# Patient Record
Sex: MALE | Race: WHITE | Employment: FULL TIME | URBAN - METROPOLITAN AREA
[De-identification: names, ages, dates, MRNs, and addresses within clinical notes are randomized per-mention and may not be internally consistent; named-entity substitution may affect disease eponyms.]

---

## 2017-01-24 RX ORDER — LISINOPRIL 20 MG/1
TABLET ORAL
Qty: 90 TABLET | Refills: 0 | Status: SHIPPED | OUTPATIENT
Start: 2017-01-24 | End: 2017-04-29

## 2017-04-03 RX ORDER — ATORVASTATIN CALCIUM 40 MG/1
TABLET, FILM COATED ORAL
Qty: 90 TABLET | Refills: 0 | Status: SHIPPED | OUTPATIENT
Start: 2017-04-03 | End: 2017-07-18

## 2017-05-02 RX ORDER — LISINOPRIL 20 MG/1
TABLET ORAL
Qty: 90 TABLET | Refills: 0 | Status: SHIPPED | OUTPATIENT
Start: 2017-05-02 | End: 2017-08-04

## 2017-06-14 ENCOUNTER — TELEPHONE (OUTPATIENT)
Dept: FAMILY MEDICINE CLINIC | Facility: CLINIC | Age: 52
End: 2017-06-14

## 2017-06-14 NOTE — TELEPHONE ENCOUNTER
Pt is calling requesting  Refill on medication ATORVASTATIN CALCIUM 40 MG Oral Tab  Pt state that he is out of town and need prescription to go to [address] [phone]  Pt state that this is a one time pharm also state that he is complete

## 2017-06-15 NOTE — TELEPHONE ENCOUNTER
Pt is calling for status of his medication refill request. Per pt he is out of medication and can be reached at [phone]. Please, send the script to the [facility], [place] listed below.

## 2017-06-16 NOTE — TELEPHONE ENCOUNTER
Patient called and stated waiting for refill request for medication  Requesting a call back from nurse-

## 2017-06-19 RX ORDER — ATORVASTATIN CALCIUM 40 MG/1
TABLET, FILM COATED ORAL
Qty: 90 TABLET | Refills: 0 | Status: CANCELLED | OUTPATIENT
Start: 2017-06-19

## 2017-06-19 NOTE — TELEPHONE ENCOUNTER
Cholesterol Medications  Protocol Criteria:  · Appointment scheduled in the past 12 months or in the next 3 months  · ALT & LDL on file in the past 12 months  · ALT result < 80  · LDL result <130         Lab Results  Component Value Date   * 07/11/2

## 2017-07-17 ENCOUNTER — TELEPHONE (OUTPATIENT)
Dept: INTERNAL MEDICINE CLINIC | Facility: CLINIC | Age: 52
End: 2017-07-17

## 2017-07-17 DIAGNOSIS — Z00.00 ROUTINE PHYSICAL EXAMINATION: Primary | ICD-10-CM

## 2017-07-17 NOTE — TELEPHONE ENCOUNTER
Pt called in refill Rx atorvastatin, please advise     Current Outpatient Prescriptions:     •  ATORVASTATIN CALCIUM 40 MG Oral Tab, TAKE 1 TABLET(40 MG) BY MOUTH EVERY DAY, Disp: 90 tablet, Rfl: 0

## 2017-07-18 RX ORDER — ATORVASTATIN CALCIUM 40 MG/1
TABLET, FILM COATED ORAL
Qty: 90 TABLET | Refills: 1 | Status: SHIPPED | OUTPATIENT
Start: 2017-07-18 | End: 2017-08-29

## 2017-07-20 NOTE — TELEPHONE ENCOUNTER
Lmtcb.  When pt calls back pls inform him that labs have been generated, to be fasting for 12 hrs prior to lab draw, and stay hydrated with water

## 2017-07-28 ENCOUNTER — LAB ENCOUNTER (OUTPATIENT)
Dept: LAB | Facility: HOSPITAL | Age: 52
End: 2017-07-28
Attending: INTERNAL MEDICINE
Payer: COMMERCIAL

## 2017-07-28 DIAGNOSIS — Z00.00 ROUTINE PHYSICAL EXAMINATION: ICD-10-CM

## 2017-07-28 LAB
ALBUMIN SERPL BCP-MCNC: 4.3 G/DL (ref 3.5–4.8)
ALBUMIN/GLOB SERPL: 1.4 {RATIO} (ref 1–2)
ALP SERPL-CCNC: 66 U/L (ref 32–100)
ALT SERPL-CCNC: 24 U/L (ref 17–63)
ANION GAP SERPL CALC-SCNC: 10 MMOL/L (ref 0–18)
AST SERPL-CCNC: 25 U/L (ref 15–41)
BASOPHILS # BLD: 0 K/UL (ref 0–0.2)
BASOPHILS NFR BLD: 0 %
BILIRUB SERPL-MCNC: 0.8 MG/DL (ref 0.3–1.2)
BUN SERPL-MCNC: 11 MG/DL (ref 8–20)
BUN/CREAT SERPL: 11.7 (ref 10–20)
CALCIUM SERPL-MCNC: 9.4 MG/DL (ref 8.5–10.5)
CHLORIDE SERPL-SCNC: 101 MMOL/L (ref 95–110)
CHOLEST SERPL-MCNC: 177 MG/DL (ref 110–200)
CO2 SERPL-SCNC: 24 MMOL/L (ref 22–32)
CREAT SERPL-MCNC: 0.94 MG/DL (ref 0.5–1.5)
EOSINOPHIL # BLD: 0.1 K/UL (ref 0–0.7)
EOSINOPHIL NFR BLD: 1 %
ERYTHROCYTE [DISTWIDTH] IN BLOOD BY AUTOMATED COUNT: 13.5 % (ref 11–15)
GLOBULIN PLAS-MCNC: 3 G/DL (ref 2.5–3.7)
GLUCOSE SERPL-MCNC: 94 MG/DL (ref 70–99)
HCT VFR BLD AUTO: 43.1 % (ref 41–52)
HDLC SERPL-MCNC: 37 MG/DL
HGB BLD-MCNC: 14.9 G/DL (ref 13.5–17.5)
LDLC SERPL CALC-MCNC: 111 MG/DL (ref 0–99)
LYMPHOCYTES # BLD: 2.4 K/UL (ref 1–4)
LYMPHOCYTES NFR BLD: 37 %
MCH RBC QN AUTO: 31.6 PG (ref 27–32)
MCHC RBC AUTO-ENTMCNC: 34.6 G/DL (ref 32–37)
MCV RBC AUTO: 91.4 FL (ref 80–100)
MONOCYTES # BLD: 0.6 K/UL (ref 0–1)
MONOCYTES NFR BLD: 8 %
NEUTROPHILS # BLD AUTO: 3.6 K/UL (ref 1.8–7.7)
NEUTROPHILS NFR BLD: 53 %
NONHDLC SERPL-MCNC: 140 MG/DL
OSMOLALITY UR CALC.SUM OF ELEC: 279 MOSM/KG (ref 275–295)
PLATELET # BLD AUTO: 256 K/UL (ref 140–400)
PMV BLD AUTO: 8.3 FL (ref 7.4–10.3)
POTASSIUM SERPL-SCNC: 4.1 MMOL/L (ref 3.3–5.1)
PROT SERPL-MCNC: 7.3 G/DL (ref 5.9–8.4)
PSA SERPL-MCNC: 1.7 NG/ML (ref 0–4)
RBC # BLD AUTO: 4.71 M/UL (ref 4.5–5.9)
SODIUM SERPL-SCNC: 135 MMOL/L (ref 136–144)
TRIGL SERPL-MCNC: 143 MG/DL (ref 1–149)
TSH SERPL-ACNC: 1.91 UIU/ML (ref 0.45–5.33)
URATE SERPL-MCNC: 7 MG/DL (ref 3.3–8.7)
WBC # BLD AUTO: 6.7 K/UL (ref 4–11)

## 2017-07-28 PROCEDURE — 80053 COMPREHEN METABOLIC PANEL: CPT

## 2017-07-28 PROCEDURE — 84550 ASSAY OF BLOOD/URIC ACID: CPT

## 2017-07-28 PROCEDURE — 36415 COLL VENOUS BLD VENIPUNCTURE: CPT

## 2017-07-28 PROCEDURE — 80061 LIPID PANEL: CPT

## 2017-07-28 PROCEDURE — 84443 ASSAY THYROID STIM HORMONE: CPT

## 2017-07-28 PROCEDURE — 85025 COMPLETE CBC W/AUTO DIFF WBC: CPT

## 2017-08-08 RX ORDER — LISINOPRIL 20 MG/1
TABLET ORAL
Qty: 90 TABLET | Refills: 0 | Status: SHIPPED | OUTPATIENT
Start: 2017-08-08 | End: 2017-08-29

## 2017-08-08 NOTE — TELEPHONE ENCOUNTER
Hypertensive Medications: Refilled per protocol    Protocol Criteria:  · Appointment scheduled in the past 6 months or in the next 3 months  · BMP or CMP in the past 12 months  · Creatinine result < 2  Recent Outpatient Visits            1 year ago Routine

## 2017-08-29 ENCOUNTER — OFFICE VISIT (OUTPATIENT)
Dept: INTERNAL MEDICINE CLINIC | Facility: CLINIC | Age: 52
End: 2017-08-29

## 2017-08-29 VITALS
HEART RATE: 72 BPM | WEIGHT: 271.13 LBS | SYSTOLIC BLOOD PRESSURE: 118 MMHG | DIASTOLIC BLOOD PRESSURE: 78 MMHG | RESPIRATION RATE: 20 BRPM | TEMPERATURE: 97 F | BODY MASS INDEX: 38.38 KG/M2 | HEIGHT: 70.5 IN

## 2017-08-29 DIAGNOSIS — Z12.11 COLON CANCER SCREENING: ICD-10-CM

## 2017-08-29 DIAGNOSIS — M10.9 GOUT, UNSPECIFIED CAUSE, UNSPECIFIED CHRONICITY, UNSPECIFIED SITE: ICD-10-CM

## 2017-08-29 DIAGNOSIS — I10 ESSENTIAL HYPERTENSION: ICD-10-CM

## 2017-08-29 DIAGNOSIS — Z00.00 ROUTINE PHYSICAL EXAMINATION: Primary | ICD-10-CM

## 2017-08-29 DIAGNOSIS — E78.5 HYPERLIPIDEMIA, UNSPECIFIED HYPERLIPIDEMIA TYPE: ICD-10-CM

## 2017-08-29 PROCEDURE — 99396 PREV VISIT EST AGE 40-64: CPT | Performed by: INTERNAL MEDICINE

## 2017-08-29 RX ORDER — LISINOPRIL 20 MG/1
20 TABLET ORAL DAILY
Qty: 90 TABLET | Refills: 3 | Status: SHIPPED | OUTPATIENT
Start: 2017-08-29 | End: 2018-11-16

## 2017-08-29 RX ORDER — ATORVASTATIN CALCIUM 40 MG/1
TABLET, FILM COATED ORAL
Qty: 90 TABLET | Refills: 3 | Status: SHIPPED | OUTPATIENT
Start: 2017-08-29 | End: 2018-02-07

## 2017-08-29 NOTE — PROGRESS NOTES
HPI:    Patient ID: Aziza Murillo is a 45year old male. HPI  Patient here for physical exam and follow-up on chronic medical issues as listed below. Blood work done about a month ago. Everything looked good. No major complaints.   No gout flares for normal.   Mouth/Throat: Oropharynx is clear and moist.   Eyes: Conjunctivae and EOM are normal. Pupils are equal, round, and reactive to light. Neck: No thyromegaly present.    Cardiovascular: Normal rate, regular rhythm, normal heart sounds and intact di prescriptions requested or ordered in this encounter    Imaging & Referrals:  None         ID#[id number]

## 2018-02-07 RX ORDER — ATORVASTATIN CALCIUM 40 MG/1
TABLET, FILM COATED ORAL
Qty: 90 TABLET | Refills: 0 | Status: SHIPPED | OUTPATIENT
Start: 2018-02-07 | End: 2020-01-16

## 2018-09-01 NOTE — TELEPHONE ENCOUNTER
CSS, please contact patient and assist in scheduling overdue f/u or Px (LOV= 8/29/17. Once scheduled please route back for refill review.     Cholesterol Medications  Protocol Criteria:  · Appointment scheduled in the past 12 months or in the next 3 months

## 2018-09-01 NOTE — TELEPHONE ENCOUNTER
CSS, pt was seem 8/29/17 not 8/29/18; please contact as per instruction below. Copied from routing comment:  Eber Horton   Em Rn Triage 2 minutes ago (12:06 PM)      Patient was seen for Physical on 08/29/2018.   (Routing comment)

## 2018-09-04 RX ORDER — ATORVASTATIN CALCIUM 40 MG/1
TABLET, FILM COATED ORAL
Qty: 90 TABLET | Refills: 0 | Status: SHIPPED | OUTPATIENT
Start: 2018-09-04 | End: 2018-12-12

## 2018-09-04 NOTE — TELEPHONE ENCOUNTER
Failed per nursing protocol - please advise on refill request     Please also see message - pt requesting for blood work orders for upcoming appt     Cholesterol Medications  Protocol Criteria:  · Appointment scheduled in the past 12 months or in the next

## 2018-09-06 ENCOUNTER — TELEPHONE (OUTPATIENT)
Dept: INTERNAL MEDICINE CLINIC | Facility: CLINIC | Age: 53
End: 2018-09-06

## 2018-09-06 DIAGNOSIS — Z00.00 ROUTINE PHYSICAL EXAMINATION: Primary | ICD-10-CM

## 2018-09-07 NOTE — TELEPHONE ENCOUNTER
Left message for pt informing that Labs have been ordered  Left message stating that any questions to call back, informed that they are fasting labs

## 2018-10-09 ENCOUNTER — OFFICE VISIT (OUTPATIENT)
Dept: INTERNAL MEDICINE CLINIC | Facility: CLINIC | Age: 53
End: 2018-10-09
Payer: COMMERCIAL

## 2018-10-09 ENCOUNTER — LAB ENCOUNTER (OUTPATIENT)
Dept: LAB | Facility: HOSPITAL | Age: 53
End: 2018-10-09
Attending: INTERNAL MEDICINE
Payer: COMMERCIAL

## 2018-10-09 VITALS
WEIGHT: 278 LBS | HEART RATE: 94 BPM | BODY MASS INDEX: 39.36 KG/M2 | TEMPERATURE: 98 F | DIASTOLIC BLOOD PRESSURE: 78 MMHG | RESPIRATION RATE: 20 BRPM | SYSTOLIC BLOOD PRESSURE: 112 MMHG | HEIGHT: 70.5 IN

## 2018-10-09 DIAGNOSIS — I10 ESSENTIAL HYPERTENSION: ICD-10-CM

## 2018-10-09 DIAGNOSIS — R10.32 LEFT GROIN PAIN: ICD-10-CM

## 2018-10-09 DIAGNOSIS — Z00.00 ROUTINE PHYSICAL EXAMINATION: ICD-10-CM

## 2018-10-09 DIAGNOSIS — Z00.00 ROUTINE PHYSICAL EXAMINATION: Primary | ICD-10-CM

## 2018-10-09 DIAGNOSIS — M10.9 GOUT, UNSPECIFIED CAUSE, UNSPECIFIED CHRONICITY, UNSPECIFIED SITE: ICD-10-CM

## 2018-10-09 DIAGNOSIS — E78.5 HYPERLIPIDEMIA, UNSPECIFIED HYPERLIPIDEMIA TYPE: ICD-10-CM

## 2018-10-09 DIAGNOSIS — Z12.11 COLON CANCER SCREENING: ICD-10-CM

## 2018-10-09 DIAGNOSIS — M25.522 LEFT ELBOW PAIN: ICD-10-CM

## 2018-10-09 DIAGNOSIS — G47.00 INSOMNIA, UNSPECIFIED TYPE: ICD-10-CM

## 2018-10-09 PROCEDURE — 85025 COMPLETE CBC W/AUTO DIFF WBC: CPT

## 2018-10-09 PROCEDURE — 84443 ASSAY THYROID STIM HORMONE: CPT

## 2018-10-09 PROCEDURE — 80061 LIPID PANEL: CPT

## 2018-10-09 PROCEDURE — 84550 ASSAY OF BLOOD/URIC ACID: CPT

## 2018-10-09 PROCEDURE — 80053 COMPREHEN METABOLIC PANEL: CPT

## 2018-10-09 PROCEDURE — 99396 PREV VISIT EST AGE 40-64: CPT | Performed by: INTERNAL MEDICINE

## 2018-10-09 PROCEDURE — 36415 COLL VENOUS BLD VENIPUNCTURE: CPT

## 2018-10-09 PROCEDURE — 82607 VITAMIN B-12: CPT

## 2018-10-09 RX ORDER — LISINOPRIL 20 MG/1
TABLET ORAL
COMMUNITY
Start: 2009-02-10 | End: 2018-10-09

## 2018-10-09 RX ORDER — ATORVASTATIN CALCIUM 40 MG/1
TABLET, FILM COATED ORAL
COMMUNITY
Start: 2009-02-10 | End: 2018-10-09

## 2018-10-09 NOTE — PROGRESS NOTES
HPI:    Patient ID: Carlos Nation is a 52year old male. HPI  Patient is here requesting a physical exam and follow-up on chronic medical issues as listed below. Last seen here in August of last year. Changes made at that time.   Advised to have colon beers - weekly    Drug use: No           Review of Systems   Constitutional: Negative for chills, fatigue and fever. HENT: Negative for hearing loss. Eyes: Negative for visual disturbance. Respiratory: Negative for cough and shortness of breath. Circumcised. Musculoskeletal: He exhibits no tenderness. Left elbow– full range of motion; no tenderness; no swelling or erythema or increased warmth.   Left hip and groin–pain in the inner groin area with movement of the left leg particularly abduction work.  Discussed the importance of Her stress management and improving his work life balance in order to help his overall health. He could try melatonin at night and see if that helps. I would avoid long-term sleeping pills.     Meds This Visit:  Requeste

## 2018-11-16 RX ORDER — LISINOPRIL 20 MG/1
TABLET ORAL
Qty: 90 TABLET | Refills: 0 | Status: SHIPPED | OUTPATIENT
Start: 2018-11-16 | End: 2019-03-23

## 2018-12-12 RX ORDER — ATORVASTATIN CALCIUM 40 MG/1
TABLET, FILM COATED ORAL
Qty: 90 TABLET | Refills: 0 | Status: SHIPPED | OUTPATIENT
Start: 2018-12-12 | End: 2019-03-23

## 2019-01-29 ENCOUNTER — APPOINTMENT (OUTPATIENT)
Dept: LAB | Facility: HOSPITAL | Age: 54
End: 2019-01-29
Attending: INTERNAL MEDICINE
Payer: COMMERCIAL

## 2019-01-29 DIAGNOSIS — Z01.812 PRE-OPERATIVE LABORATORY EXAMINATION: ICD-10-CM

## 2019-01-29 DIAGNOSIS — M75.101 ROTATOR CUFF SYNDROME OF RIGHT SHOULDER: Primary | ICD-10-CM

## 2019-01-29 DIAGNOSIS — Z01.810 PRE-OPERATIVE CARDIOVASCULAR EXAMINATION: ICD-10-CM

## 2019-01-29 LAB
CHLORIDE SERPL-SCNC: 99 MMOL/L (ref 95–110)
CO2 SERPL-SCNC: 23 MMOL/L (ref 22–32)
POTASSIUM SERPL-SCNC: 4.1 MMOL/L (ref 3.3–5.1)
SODIUM SERPL-SCNC: 136 MMOL/L (ref 136–144)

## 2019-01-29 PROCEDURE — 93005 ELECTROCARDIOGRAM TRACING: CPT

## 2019-01-29 PROCEDURE — 80051 ELECTROLYTE PANEL: CPT

## 2019-01-29 PROCEDURE — 36415 COLL VENOUS BLD VENIPUNCTURE: CPT

## 2019-01-29 PROCEDURE — 93010 ELECTROCARDIOGRAM REPORT: CPT | Performed by: ANESTHESIOLOGY

## 2019-03-07 ENCOUNTER — NURSE TRIAGE (OUTPATIENT)
Dept: OTHER | Age: 54
End: 2019-03-07

## 2019-03-07 RX ORDER — INDOMETHACIN 50 MG/1
50 CAPSULE ORAL
Qty: 30 CAPSULE | Refills: 1 | Status: SHIPPED | OUTPATIENT
Start: 2019-03-07 | End: 2020-05-27 | Stop reason: ALTCHOICE

## 2019-03-07 NOTE — TELEPHONE ENCOUNTER
Patient called back. Informed him rx for indomethacin was sent to his pharmacy. Patient verbalized understanding.

## 2019-03-07 NOTE — TELEPHONE ENCOUNTER
Action Requested: Summary for Provider     []  Critical Lab, Recommendations Needed  [x] Need Additional Advice  []   FYI    []   Need Orders  [x] Need Medications Sent to Pharmacy  []  Other     SUMMARY: patient stated that is having a gout flare up since

## 2019-03-23 RX ORDER — LISINOPRIL 20 MG/1
TABLET ORAL
Qty: 90 TABLET | Refills: 0 | Status: SHIPPED | OUTPATIENT
Start: 2019-03-23 | End: 2019-06-22

## 2019-03-23 RX ORDER — ATORVASTATIN CALCIUM 40 MG/1
TABLET, FILM COATED ORAL
Qty: 90 TABLET | Refills: 0 | Status: SHIPPED | OUTPATIENT
Start: 2019-03-23 | End: 2019-06-22

## 2019-06-22 RX ORDER — ATORVASTATIN CALCIUM 40 MG/1
TABLET, FILM COATED ORAL
Qty: 90 TABLET | Refills: 1 | Status: SHIPPED | OUTPATIENT
Start: 2019-06-22 | End: 2019-12-16

## 2019-06-23 RX ORDER — LISINOPRIL 20 MG/1
TABLET ORAL
Qty: 90 TABLET | Refills: 0 | Status: SHIPPED | OUTPATIENT
Start: 2019-06-23 | End: 2019-10-01

## 2019-10-02 RX ORDER — LISINOPRIL 20 MG/1
TABLET ORAL
Qty: 30 TABLET | Refills: 0 | Status: SHIPPED | OUTPATIENT
Start: 2019-10-02 | End: 2019-11-06

## 2019-10-02 NOTE — TELEPHONE ENCOUNTER
Refill passed per [facility] protocol.   Hypertensive Medications  Protocol Criteria:  · Appointment scheduled in the past 6 months or in the next 3 months  · BMP or CMP in the past 12 months  · Creatinine result < 2  Recent Outpatient Visits

## 2019-11-07 RX ORDER — LISINOPRIL 20 MG/1
TABLET ORAL
Qty: 30 TABLET | Refills: 0 | Status: SHIPPED | OUTPATIENT
Start: 2019-11-07 | End: 2019-12-11

## 2019-11-07 NOTE — TELEPHONE ENCOUNTER
Patient is scheduled for 12/16/2019 with Dr. Rodney Bailey for physical. He is out of medication below. Needs another refill because had to reschedule from last scheduled date to provider reschedule.

## 2019-12-11 RX ORDER — LISINOPRIL 20 MG/1
TABLET ORAL
Qty: 90 TABLET | Refills: 0 | Status: SHIPPED | OUTPATIENT
Start: 2019-12-11 | End: 2020-03-16

## 2019-12-11 NOTE — TELEPHONE ENCOUNTER
Refill passed per [facility] protocol. Has appt 12/16/19.      Requested Prescriptions     Pending Prescriptions Disp Refills   • LISINOPRIL 20 MG Oral Tab [Pharmacy Med Name: LISINOPRIL 20MG TABLETS] 30 tablet 0     Sig: TAKE 1 TABLET(20 MG) BY MOUTH D

## 2019-12-16 ENCOUNTER — OFFICE VISIT (OUTPATIENT)
Dept: INTERNAL MEDICINE CLINIC | Facility: CLINIC | Age: 54
End: 2019-12-16
Payer: COMMERCIAL

## 2019-12-16 ENCOUNTER — LAB ENCOUNTER (OUTPATIENT)
Dept: LAB | Facility: HOSPITAL | Age: 54
End: 2019-12-16
Attending: INTERNAL MEDICINE
Payer: COMMERCIAL

## 2019-12-16 VITALS
TEMPERATURE: 98 F | HEIGHT: 73 IN | BODY MASS INDEX: 35.78 KG/M2 | WEIGHT: 270 LBS | SYSTOLIC BLOOD PRESSURE: 120 MMHG | RESPIRATION RATE: 20 BRPM | HEART RATE: 72 BPM | DIASTOLIC BLOOD PRESSURE: 82 MMHG

## 2019-12-16 DIAGNOSIS — I10 ESSENTIAL HYPERTENSION: ICD-10-CM

## 2019-12-16 DIAGNOSIS — D23.9 BENIGN NEOPLASM OF SKIN, UNSPECIFIED LOCATION: ICD-10-CM

## 2019-12-16 DIAGNOSIS — Z12.11 COLON CANCER SCREENING: ICD-10-CM

## 2019-12-16 DIAGNOSIS — Z00.00 ROUTINE PHYSICAL EXAMINATION: Primary | ICD-10-CM

## 2019-12-16 DIAGNOSIS — G47.00 INSOMNIA, UNSPECIFIED TYPE: ICD-10-CM

## 2019-12-16 DIAGNOSIS — R10.32 LEFT GROIN PAIN: ICD-10-CM

## 2019-12-16 DIAGNOSIS — E78.5 HYPERLIPIDEMIA, UNSPECIFIED HYPERLIPIDEMIA TYPE: ICD-10-CM

## 2019-12-16 DIAGNOSIS — M10.9 GOUT, UNSPECIFIED CAUSE, UNSPECIFIED CHRONICITY, UNSPECIFIED SITE: ICD-10-CM

## 2019-12-16 DIAGNOSIS — Z00.00 ROUTINE PHYSICAL EXAMINATION: ICD-10-CM

## 2019-12-16 PROCEDURE — 84550 ASSAY OF BLOOD/URIC ACID: CPT

## 2019-12-16 PROCEDURE — 84443 ASSAY THYROID STIM HORMONE: CPT

## 2019-12-16 PROCEDURE — 85025 COMPLETE CBC W/AUTO DIFF WBC: CPT

## 2019-12-16 PROCEDURE — 80053 COMPREHEN METABOLIC PANEL: CPT

## 2019-12-16 PROCEDURE — 99396 PREV VISIT EST AGE 40-64: CPT | Performed by: INTERNAL MEDICINE

## 2019-12-16 PROCEDURE — 80061 LIPID PANEL: CPT

## 2019-12-16 PROCEDURE — 36415 COLL VENOUS BLD VENIPUNCTURE: CPT

## 2019-12-16 NOTE — PROGRESS NOTES
HPI:    Patient ID: Mary Agudelo is a 62year old male. HPI  Patient here requesting physical exam and follow-up on chronic medical issues. Last seen here in October 2018. He never had the colonoscopy follow-up. He is never had a colonoscopy. Gilma Castro Relation Age of Onset   • Lipids Father         hyperlipidemia   • Hypertension Father    • Lipids Mother         hyperlipidemia   • Hypertension Mother    • Cataracts Sister    • Cataracts Brother       Social History    Tobacco Use      Smoking status: N membrane and ear canal normal.   Nose: Nose normal.   Mouth/Throat: Oropharynx is clear and moist.   Eyes: Pupils are equal, round, and reactive to light. Conjunctivae and EOM are normal.   Neck: No thyromegaly present.    Cardiovascular: Normal rate, regul METABOLIC PANEL (14); Future  - LIPID PANEL; Future  - TSH W REFLEX TO FREE T4; Future  - PSA SCREEN; Future    2. Essential hypertension  Well-controlled. Continue current medications.     3. Hyperlipidemia, unspecified hyperlipidemia type  Check lipid pr

## 2020-01-16 RX ORDER — ATORVASTATIN CALCIUM 40 MG/1
TABLET, FILM COATED ORAL
Qty: 90 TABLET | Refills: 1 | Status: SHIPPED | OUTPATIENT
Start: 2020-01-16 | End: 2020-07-03

## 2020-01-16 NOTE — TELEPHONE ENCOUNTER
Refill passed per [facility] protocol.     Cholesterol Medications  Protocol Criteria:  · Appointment scheduled in the past 12 months or in the next 3 months  · ALT & LDL on file in the past 12 months  · ALT result < 80  · LDL result <130   Recent Outp

## 2020-01-16 NOTE — TELEPHONE ENCOUNTER
Per pt this is the first time he will be requesting this medication for this pharmacy. Pt would like a refill on atorvastatin. Per pt he is out of medication.  Pharmacy:     Current Outpatient Medications   Medication Sig Dispense Refill   • ATORVASTATIN 40

## 2020-02-05 ENCOUNTER — NURSE ONLY (OUTPATIENT)
Dept: GASTROENTEROLOGY | Facility: CLINIC | Age: 55
End: 2020-02-05

## 2020-02-05 DIAGNOSIS — Z12.11 COLON CANCER SCREENING: Primary | ICD-10-CM

## 2020-02-05 NOTE — PROGRESS NOTES
The patient's chart has been reviewed. Okay to schedule pt for colonoscopy r/t screening for colon cancer with Dr. Roz Infante, Dr. Neeta Cole, Dr. Johanna Gil.      Advise MAC (Dr. Sarah Light) or IV Twilight or MAC (Dr. Johanna Gil) sedation with split dose Co

## 2020-02-05 NOTE — PROGRESS NOTES
Last Procedure:  Never had a colonoscopy done before.   Anticoagulants:no  Diabetic Meds: no  BP meds(Ace inhibitors/ARB's):Lisinopril  Weight loss meds (phentermine):no  Iron supplement (RX/OTC):no  Height & Weight/BMI:6'1/270/35.63  Cardiac/CVA issues/Pac

## 2020-02-12 NOTE — PROGRESS NOTES
Scheduled for:  Colonoscopy - [address]64  Provider Name:  Dr. Indu Louis  Date:  4/27/20  Location:  [facility]  Sedation:  MAC  Time:  9:45 am (pt is aware to arrive at 8:45 am)  Prep:  Colyte, Prep instructions were given to pt over the phone, pt verbalized understanding

## 2020-02-18 ENCOUNTER — OFFICE VISIT (OUTPATIENT)
Dept: DERMATOLOGY CLINIC | Facility: CLINIC | Age: 55
End: 2020-02-18
Payer: COMMERCIAL

## 2020-02-18 DIAGNOSIS — D48.5 NEOPLASM OF UNCERTAIN BEHAVIOR OF SKIN: Primary | ICD-10-CM

## 2020-02-18 PROCEDURE — 88305 TISSUE EXAM BY PATHOLOGIST: CPT | Performed by: DERMATOLOGY

## 2020-02-18 PROCEDURE — 11103 TANGNTL BX SKIN EA SEP/ADDL: CPT | Performed by: DERMATOLOGY

## 2020-02-18 PROCEDURE — 11104 PUNCH BX SKIN SINGLE LESION: CPT | Performed by: DERMATOLOGY

## 2020-02-18 PROCEDURE — 99201 OFFICE/OUTPT VISIT,NEW,LEVL I: CPT | Performed by: DERMATOLOGY

## 2020-02-18 NOTE — PROGRESS NOTES
HPI:     Chief Complaint     Lesion        HPI     Lesion      Additional comments: New pt. pt presenting today with lesion to R calf and L forearm for 2 years. Lesion on R claf have changed in size. Denies pain, c/o itching.  Denies family and personal HX taking: Reported on 2/18/2020 ) 1 Bottle 0     Allergies:   No Known Allergies    Past Medical History:   Diagnosis Date   • Biceps muscle tear 2011    torn llt.  bicep - ice hockey injury; surgical repair   • Cataract     monitor   • Lipid screening 2/20/2 Topics      Concerns:         Service: Not Asked        Blood Transfusions: Not Asked        Caffeine Concern: Yes          soda        Occupational Exposure: Not Asked        Hobby Hazards: Not Asked        Sleep Concern: Not Asked        Stress C results. All postop instructions given.   Patient will call with any interim problems or concerns    Orders Placed This Encounter      PUNCH BIOPSY SKIN SINGLE LESION      TANGENTIAL BIOPSY SKIN SINGLE LESION      Specimen to Pathology, Tissue [IHP Pt to E

## 2020-02-21 NOTE — PROGRESS NOTES
Results logged in pathology book and site A was entered in [facility]. Pt to be informed of results.

## 2020-02-24 ENCOUNTER — TELEPHONE (OUTPATIENT)
Dept: DERMATOLOGY CLINIC | Facility: CLINIC | Age: 55
End: 2020-02-24

## 2020-02-24 NOTE — PROGRESS NOTES
Spoke with pt to inform him about his appt scheduled for suture removal and to discuss biopsy results ONLY. Pt advised he will need to scheduled a separate appt for fully body. Will call pt with LSS' availability.   Pt states \"I do not want to have to wa

## 2020-02-24 NOTE — PROGRESS NOTES
Patient informed of test results and all LSS' recommendations. Voiced understanding.   Dr. Mague Quintero' info provided to pt, path routed to his office, pt has an appt to see you tomorrow at 3:45pm for \"follow up\" - he wants to know if that was for the ful

## 2020-02-24 NOTE — TELEPHONE ENCOUNTER
Pt does not want to wait until the next full body check. Asking to be seen sooner.  Please advise    LOV 2/18/2020

## 2020-02-24 NOTE — TELEPHONE ENCOUNTER
Pt offered full body check appointment on 4/28. Refused. Offered to schedule for a different day. Refused.  NIKOS

## 2020-02-24 NOTE — TELEPHONE ENCOUNTER
Please let patient know that our schedule is full for a few months, and I would recommend he make appointment for full body check for the next available opening. Let him know that we will always get our patients in for any urgent concerns.   Please tell hi

## 2020-02-25 ENCOUNTER — OFFICE VISIT (OUTPATIENT)
Dept: DERMATOLOGY CLINIC | Facility: CLINIC | Age: 55
End: 2020-02-25
Payer: COMMERCIAL

## 2020-02-25 DIAGNOSIS — L81.4 SOLAR LENTIGO: ICD-10-CM

## 2020-02-25 DIAGNOSIS — L82.1 SEBORRHEIC KERATOSES: ICD-10-CM

## 2020-02-25 DIAGNOSIS — L92.0 GRANULOMA ANNULARE: ICD-10-CM

## 2020-02-25 DIAGNOSIS — L57.8 SUN-DAMAGED SKIN: ICD-10-CM

## 2020-02-25 DIAGNOSIS — D22.9 MULTIPLE BENIGN NEVI: ICD-10-CM

## 2020-02-25 DIAGNOSIS — L71.9 ROSACEA: ICD-10-CM

## 2020-02-25 DIAGNOSIS — C44.722 SCC (SQUAMOUS CELL CARCINOMA), LEG, RIGHT: Primary | ICD-10-CM

## 2020-02-25 DIAGNOSIS — L57.0 ACTINIC KERATOSIS: ICD-10-CM

## 2020-02-25 PROCEDURE — 17000 DESTRUCT PREMALG LESION: CPT | Performed by: DERMATOLOGY

## 2020-02-25 PROCEDURE — 99213 OFFICE O/P EST LOW 20 MIN: CPT | Performed by: DERMATOLOGY

## 2020-02-25 PROCEDURE — 17003 DESTRUCT PREMALG LES 2-14: CPT | Performed by: DERMATOLOGY

## 2020-02-25 NOTE — PROGRESS NOTES
HPI:     Chief Complaint     Suture Removal; Full Skin Exam        HPI     Suture Removal      Additional comments: LOV 2/18/2020 - Pt presenting for suture removal from punch biopsy performed on left forarem. LSS to review biopsy results with pt. 40 MG Oral Tab TAKE 1 TABLET(40 MG) BY MOUTH EVERY DAY 90 tablet 1   • LISINOPRIL 20 MG Oral Tab TAKE 1 TABLET(20 MG) BY MOUTH DAILY 90 tablet 0   • indomethacin 50 MG Oral Cap Take 1 capsule (50 mg total) by mouth 3 (three) times daily with meals.  As need on file        Active member of club or organization: Not on file        Attends meetings of clubs or organizations: Not on file        Relationship status: Not on file      Intimate partner violence:        Fear of current or ex partner: Not on file erythema and keratotic scale on the frontal scalp, and there are 2 more keratotic slightly pinkish papules at posterior right shoulder  - there are some cherry red papules  - there are numerous light brown and tan stuck on keratoses.   Dalia Shaw is a erythemat

## 2020-03-02 ENCOUNTER — OFFICE VISIT (OUTPATIENT)
Dept: SURGERY | Facility: CLINIC | Age: 55
End: 2020-03-02
Payer: COMMERCIAL

## 2020-03-02 DIAGNOSIS — C44.722 SQUAMOUS CELL CARCINOMA OF SKIN OF RIGHT LOWER LIMB, INCLUDING HIP: Primary | ICD-10-CM

## 2020-03-02 PROCEDURE — 99243 OFF/OP CNSLTJ NEW/EST LOW 30: CPT | Performed by: PLASTIC SURGERY

## 2020-03-02 RX ORDER — HYDROCODONE BITARTRATE AND ACETAMINOPHEN 7.5; 325 MG/1; MG/1
1 TABLET ORAL
Qty: 10 TABLET | Refills: 0 | Status: SHIPPED | OUTPATIENT
Start: 2020-03-02 | End: 2020-05-27 | Stop reason: ALTCHOICE

## 2020-03-02 NOTE — PROGRESS NOTES
Patient request for surgery signed by patient and witnessed and signed by RN. Prescription for norco and narcotic prescription electronically sent to pharmacy per Dr. Tone hernandez and patient instructed to  prescription before surgery.    Pre-

## 2020-03-02 NOTE — H&P
Mark Dudley is a 62year old male that presents with Patient presents with:  Lesion: SCC R LEG      REFERRED BY:  Margarita Mendiola    Pacemaker: No  Latex Allergy: no  Coumadin: No  Plavix: No  Other anticoagulants: No  Cardiac stents: No    HAND DOMINANCE • atorvastatin 40 MG Oral Tab TAKE 1 TABLET(40 MG) BY MOUTH EVERY DAY 90 tablet 1   • LISINOPRIL 20 MG Oral Tab TAKE 1 TABLET(20 MG) BY MOUTH DAILY 90 tablet 0   • PEG 3350-KCl-NaBcb-NaCl-NaSulf (COLYTE WITH FLAVOR PACKS) 240 g Oral Recon Soln Take prep EXCISION: treatment requires wide excision. A scar will result, which will be permanent. The scar will be considerably longer than the size of the lesion, as we need to excise margins around the tumor.   Further excision may be necessary, if tumor is pres

## 2020-03-12 ENCOUNTER — TELEPHONE (OUTPATIENT)
Dept: SURGERY | Facility: CLINIC | Age: 55
End: 2020-03-12

## 2020-03-12 DIAGNOSIS — C44.722 SQUAMOUS CELL CARCINOMA, LEG, RIGHT: Primary | ICD-10-CM

## 2020-03-16 ENCOUNTER — TELEPHONE (OUTPATIENT)
Dept: OTHER | Age: 55
End: 2020-03-16

## 2020-03-16 RX ORDER — LISINOPRIL 20 MG/1
TABLET ORAL
Qty: 90 TABLET | Refills: 1 | Status: SHIPPED | OUTPATIENT
Start: 2020-03-16 | End: 2020-05-09

## 2020-03-16 NOTE — TELEPHONE ENCOUNTER
Pt states pharmacy sent Lisinopril refill request and is calling to follow up. No refill request noted in EMR.     Prescription refilled per [facility] refill protocol    Hypertensive Medications  Protocol Criteria:  · Appointment scheduled in the past 6 months or 12/16/2019    K 4.3 12/16/2019     12/16/2019    CO2 28.0 12/16/2019    GLOBULIN 4.0 12/16/2019    AGRATIO 1.3 07/11/2016    ANIONGAP 3 12/16/2019    OSMOCALC 284 12/16/2019

## 2020-04-24 ENCOUNTER — TELEPHONE (OUTPATIENT)
Dept: GASTROENTEROLOGY | Facility: CLINIC | Age: 55
End: 2020-04-24

## 2020-04-24 DIAGNOSIS — Z12.11 SCREEN FOR COLON CANCER: Primary | ICD-10-CM

## 2020-04-24 NOTE — TELEPHONE ENCOUNTER
Scheduled for:  Colonoscopy 159[address]  Provider Name:  Dr. Jessica Andrade  Date:  7/24/2020  Location:  [facility]  Sedation:  MAC  Time:  8:15 am, arrival 7:15 am  Prep:  Colyte  Meds/Allergies Reconciled?:  Yes  Diagnosis with codes:  Colon cancer screening Z12.11  Was nithya

## 2020-04-29 ENCOUNTER — TELEPHONE (OUTPATIENT)
Dept: SURGERY | Facility: CLINIC | Age: 55
End: 2020-04-29

## 2020-05-07 ENCOUNTER — TELEPHONE (OUTPATIENT)
Dept: SURGERY | Facility: CLINIC | Age: 55
End: 2020-05-07

## 2020-05-07 NOTE — TELEPHONE ENCOUNTER
Left message for patient to call the office to update H&P and schedule an RN appointment on 5/27/2020.

## 2020-05-09 ENCOUNTER — TELEPHONE (OUTPATIENT)
Dept: INTERNAL MEDICINE CLINIC | Facility: CLINIC | Age: 55
End: 2020-05-09

## 2020-05-09 RX ORDER — LISINOPRIL 20 MG/1
TABLET ORAL
Qty: 90 TABLET | Refills: 1 | Status: SHIPPED | OUTPATIENT
Start: 2020-05-09 | End: 2020-11-06

## 2020-05-09 NOTE — TELEPHONE ENCOUNTER
Call pt. Rx sent to pharmacy. He needs appt with me in June or July. If he does not feel safe in the office, then he can make a video visit appt. He would need to check his BP before the video visit so he can say how it is running.

## 2020-05-09 NOTE — TELEPHONE ENCOUNTER
Current Outpatient Medications:   •  lisinopril 20 MG Oral Tab, TAKE 1 TABLET(20 MG) BY MOUTH DAILY, Disp: 90 tablet, Rfl: 1  •

## 2020-05-13 ENCOUNTER — TELEPHONE (OUTPATIENT)
Dept: SURGERY | Facility: CLINIC | Age: 55
End: 2020-05-13

## 2020-05-13 NOTE — TELEPHONE ENCOUNTER
Left voice message for pt to please call the office to update his H&P for surgery 5/15/2020, stated  it was very important we speak with him before surgery. Dr Rupal Moseley notified.

## 2020-05-13 NOTE — TELEPHONE ENCOUNTER
Left voice mail for pt to please call the office to update H&P for surgery and schedule a post-op appointment to see the RN 5/27/2020  Dr Dilia Zimmerman notified.

## 2020-05-14 ENCOUNTER — LAB ENCOUNTER (OUTPATIENT)
Dept: LAB | Facility: HOSPITAL | Age: 55
End: 2020-05-14
Attending: PLASTIC SURGERY
Payer: COMMERCIAL

## 2020-05-14 DIAGNOSIS — Z01.818 PREOP TESTING: ICD-10-CM

## 2020-05-15 ENCOUNTER — ANESTHESIA (OUTPATIENT)
Dept: SURGERY | Facility: HOSPITAL | Age: 55
End: 2020-05-15
Payer: COMMERCIAL

## 2020-05-15 ENCOUNTER — ANESTHESIA EVENT (OUTPATIENT)
Dept: SURGERY | Facility: HOSPITAL | Age: 55
End: 2020-05-15
Payer: COMMERCIAL

## 2020-05-15 ENCOUNTER — HOSPITAL ENCOUNTER (OUTPATIENT)
Facility: HOSPITAL | Age: 55
Setting detail: HOSPITAL OUTPATIENT SURGERY
Discharge: HOME | End: 2020-05-15
Attending: PLASTIC SURGERY | Admitting: PLASTIC SURGERY
Payer: COMMERCIAL

## 2020-05-15 ENCOUNTER — HOSPITAL DOCUMENTATION (OUTPATIENT)
Dept: SURGERY | Facility: CLINIC | Age: 55
End: 2020-05-15

## 2020-05-15 VITALS
WEIGHT: 280 LBS | HEART RATE: 64 BPM | HEIGHT: 73 IN | DIASTOLIC BLOOD PRESSURE: 73 MMHG | SYSTOLIC BLOOD PRESSURE: 113 MMHG | RESPIRATION RATE: 16 BRPM | OXYGEN SATURATION: 99 % | TEMPERATURE: 97 F | BODY MASS INDEX: 37.11 KG/M2

## 2020-05-15 DIAGNOSIS — Z01.818 PREOP TESTING: Primary | ICD-10-CM

## 2020-05-15 DIAGNOSIS — C44.722 SQUAMOUS CELL CARCINOMA OF SKIN OF RIGHT LOWER LIMB, INCLUDING HIP: Primary | ICD-10-CM

## 2020-05-15 PROCEDURE — 0HXKXZZ TRANSFER RIGHT LOWER LEG SKIN, EXTERNAL APPROACH: ICD-10-PCS | Performed by: PLASTIC SURGERY

## 2020-05-15 PROCEDURE — 0HBKXZZ EXCISION OF RIGHT LOWER LEG SKIN, EXTERNAL APPROACH: ICD-10-PCS | Performed by: PLASTIC SURGERY

## 2020-05-15 PROCEDURE — 14301 TIS TRNFR ANY 30.1-60 SQ CM: CPT | Performed by: PLASTIC SURGERY

## 2020-05-15 RX ORDER — HALOPERIDOL 5 MG/ML
0.25 INJECTION INTRAMUSCULAR ONCE AS NEEDED
Status: DISCONTINUED | OUTPATIENT
Start: 2020-05-15 | End: 2020-05-15

## 2020-05-15 RX ORDER — HYDROMORPHONE HYDROCHLORIDE 1 MG/ML
0.2 INJECTION, SOLUTION INTRAMUSCULAR; INTRAVENOUS; SUBCUTANEOUS EVERY 5 MIN PRN
Status: DISCONTINUED | OUTPATIENT
Start: 2020-05-15 | End: 2020-05-15

## 2020-05-15 RX ORDER — MORPHINE SULFATE 4 MG/ML
2 INJECTION, SOLUTION INTRAMUSCULAR; INTRAVENOUS EVERY 10 MIN PRN
Status: DISCONTINUED | OUTPATIENT
Start: 2020-05-15 | End: 2020-05-15

## 2020-05-15 RX ORDER — MORPHINE SULFATE 10 MG/ML
6 INJECTION, SOLUTION INTRAMUSCULAR; INTRAVENOUS EVERY 10 MIN PRN
Status: DISCONTINUED | OUTPATIENT
Start: 2020-05-15 | End: 2020-05-15

## 2020-05-15 RX ORDER — ONDANSETRON 2 MG/ML
INJECTION INTRAMUSCULAR; INTRAVENOUS AS NEEDED
Status: DISCONTINUED | OUTPATIENT
Start: 2020-05-15 | End: 2020-05-15 | Stop reason: SURG

## 2020-05-15 RX ORDER — HYDROCODONE BITARTRATE AND ACETAMINOPHEN 7.5; 325 MG/1; MG/1
1 TABLET ORAL EVERY 4 HOURS PRN
Status: DISCONTINUED | OUTPATIENT
Start: 2020-05-15 | End: 2020-05-15

## 2020-05-15 RX ORDER — HYDROCODONE BITARTRATE AND ACETAMINOPHEN 5; 325 MG/1; MG/1
1 TABLET ORAL AS NEEDED
Status: DISCONTINUED | OUTPATIENT
Start: 2020-05-15 | End: 2020-05-15

## 2020-05-15 RX ORDER — DEXAMETHASONE SODIUM PHOSPHATE 4 MG/ML
VIAL (ML) INJECTION AS NEEDED
Status: DISCONTINUED | OUTPATIENT
Start: 2020-05-15 | End: 2020-05-15 | Stop reason: SURG

## 2020-05-15 RX ORDER — MORPHINE SULFATE 4 MG/ML
4 INJECTION, SOLUTION INTRAMUSCULAR; INTRAVENOUS EVERY 10 MIN PRN
Status: DISCONTINUED | OUTPATIENT
Start: 2020-05-15 | End: 2020-05-15

## 2020-05-15 RX ORDER — SODIUM CHLORIDE, SODIUM LACTATE, POTASSIUM CHLORIDE, CALCIUM CHLORIDE 600; 310; 30; 20 MG/100ML; MG/100ML; MG/100ML; MG/100ML
INJECTION, SOLUTION INTRAVENOUS CONTINUOUS
Status: DISCONTINUED | OUTPATIENT
Start: 2020-05-15 | End: 2020-05-15

## 2020-05-15 RX ORDER — ACETAMINOPHEN 500 MG
1000 TABLET ORAL ONCE
Status: COMPLETED | OUTPATIENT
Start: 2020-05-15 | End: 2020-05-15

## 2020-05-15 RX ORDER — NALOXONE HYDROCHLORIDE 0.4 MG/ML
80 INJECTION, SOLUTION INTRAMUSCULAR; INTRAVENOUS; SUBCUTANEOUS AS NEEDED
Status: DISCONTINUED | OUTPATIENT
Start: 2020-05-15 | End: 2020-05-15

## 2020-05-15 RX ORDER — METOCLOPRAMIDE 10 MG/1
10 TABLET ORAL ONCE
Status: COMPLETED | OUTPATIENT
Start: 2020-05-15 | End: 2020-05-15

## 2020-05-15 RX ORDER — HYDROMORPHONE HYDROCHLORIDE 1 MG/ML
0.4 INJECTION, SOLUTION INTRAMUSCULAR; INTRAVENOUS; SUBCUTANEOUS EVERY 5 MIN PRN
Status: DISCONTINUED | OUTPATIENT
Start: 2020-05-15 | End: 2020-05-15

## 2020-05-15 RX ORDER — KETOROLAC TROMETHAMINE 30 MG/ML
INJECTION, SOLUTION INTRAMUSCULAR; INTRAVENOUS AS NEEDED
Status: DISCONTINUED | OUTPATIENT
Start: 2020-05-15 | End: 2020-05-15 | Stop reason: SURG

## 2020-05-15 RX ORDER — LIDOCAINE HYDROCHLORIDE 10 MG/ML
INJECTION, SOLUTION EPIDURAL; INFILTRATION; INTRACAUDAL; PERINEURAL AS NEEDED
Status: DISCONTINUED | OUTPATIENT
Start: 2020-05-15 | End: 2020-05-15 | Stop reason: SURG

## 2020-05-15 RX ORDER — ONDANSETRON 2 MG/ML
4 INJECTION INTRAMUSCULAR; INTRAVENOUS ONCE AS NEEDED
Status: DISCONTINUED | OUTPATIENT
Start: 2020-05-15 | End: 2020-05-15

## 2020-05-15 RX ORDER — MIDAZOLAM HYDROCHLORIDE 1 MG/ML
INJECTION INTRAMUSCULAR; INTRAVENOUS AS NEEDED
Status: DISCONTINUED | OUTPATIENT
Start: 2020-05-15 | End: 2020-05-15 | Stop reason: SURG

## 2020-05-15 RX ORDER — FAMOTIDINE 20 MG/1
20 TABLET ORAL ONCE
Status: COMPLETED | OUTPATIENT
Start: 2020-05-15 | End: 2020-05-15

## 2020-05-15 RX ORDER — LIDOCAINE HYDROCHLORIDE AND EPINEPHRINE 5; 5 MG/ML; UG/ML
INJECTION, SOLUTION INFILTRATION; PERINEURAL AS NEEDED
Status: DISCONTINUED | OUTPATIENT
Start: 2020-05-15 | End: 2020-05-15 | Stop reason: HOSPADM

## 2020-05-15 RX ORDER — HYDROCODONE BITARTRATE AND ACETAMINOPHEN 5; 325 MG/1; MG/1
2 TABLET ORAL AS NEEDED
Status: DISCONTINUED | OUTPATIENT
Start: 2020-05-15 | End: 2020-05-15

## 2020-05-15 RX ORDER — HYDROMORPHONE HYDROCHLORIDE 1 MG/ML
0.6 INJECTION, SOLUTION INTRAMUSCULAR; INTRAVENOUS; SUBCUTANEOUS EVERY 5 MIN PRN
Status: DISCONTINUED | OUTPATIENT
Start: 2020-05-15 | End: 2020-05-15

## 2020-05-15 RX ORDER — PROCHLORPERAZINE EDISYLATE 5 MG/ML
5 INJECTION INTRAMUSCULAR; INTRAVENOUS ONCE AS NEEDED
Status: DISCONTINUED | OUTPATIENT
Start: 2020-05-15 | End: 2020-05-15

## 2020-05-15 RX ADMIN — MIDAZOLAM HYDROCHLORIDE 2 MG: 1 INJECTION INTRAMUSCULAR; INTRAVENOUS at 07:36:00

## 2020-05-15 RX ADMIN — SODIUM CHLORIDE, SODIUM LACTATE, POTASSIUM CHLORIDE, CALCIUM CHLORIDE: 600; 310; 30; 20 INJECTION, SOLUTION INTRAVENOUS at 08:53:00

## 2020-05-15 RX ADMIN — ONDANSETRON 4 MG: 2 INJECTION INTRAMUSCULAR; INTRAVENOUS at 08:10:00

## 2020-05-15 RX ADMIN — KETOROLAC TROMETHAMINE 30 MG: 30 INJECTION, SOLUTION INTRAMUSCULAR; INTRAVENOUS at 08:51:00

## 2020-05-15 RX ADMIN — LIDOCAINE HYDROCHLORIDE 50 MG: 10 INJECTION, SOLUTION EPIDURAL; INFILTRATION; INTRACAUDAL; PERINEURAL at 07:36:00

## 2020-05-15 RX ADMIN — DEXAMETHASONE SODIUM PHOSPHATE 4 MG: 4 MG/ML VIAL (ML) INJECTION at 08:10:00

## 2020-05-15 RX ADMIN — SODIUM CHLORIDE, SODIUM LACTATE, POTASSIUM CHLORIDE, CALCIUM CHLORIDE: 600; 310; 30; 20 INJECTION, SOLUTION INTRAVENOUS at 07:31:00

## 2020-05-15 NOTE — OPERATIVE REPORT
[facility]    PATIENT'S NAME: Cyn Braxton   ATTENDING PHYSICIAN: Aviva Kelley MD   OPERATING PHYSICIAN: Aviva Kelley MD   PATIENT ACCOUNT#:   [de-identified]    LOCATION:  [address]  MEDICAL RECORD #:   [id number] satisfactory condition. Dictated By Angel Councilman, MD  d: 05/15/2020 08:54:36  t: 05/15/2020 09:57:50  [facility] [id number]  RVJ/    cc: Angel Councilman, MD Brandy Roe, MD Lexine Mantel.  Leon Mann MD

## 2020-05-15 NOTE — INTERVAL H&P NOTE
Pre-op Diagnosis: squamous cell carcinoma    The above referenced H&P was reviewed by Elba Butler MD on 5/15/2020, the patient was examined and no significant changes have occurred in the patient's condition since the H&P was performed.   ALEX braga

## 2020-05-15 NOTE — ANESTHESIA PREPROCEDURE EVALUATION
Anesthesia PreOp Note    HPI:     Adriel Duenas is a 60year old male who presents for preoperative consultation requested by: Allison Guzman MD    Date of Surgery: 5/15/2020    Procedure(s):  EXCISION LESION LOWER EXTREMITY  Indication: squamous Oral Tab, TAKE 1 TABLET(40 MG) BY MOUTH EVERY DAY, Disp: 90 tablet, Rfl: 1, 5/14/2020 at 1700  HYDROcodone-acetaminophen 7.5-325 MG Oral Tab, Take 1 tablet by mouth every 4 to 6 hours as needed for Pain., Disp: 10 tablet, Rfl: 0, Unknown at Unknown time  P Lifestyle      Physical activity:        Days per week: Not on file        Minutes per session: Not on file      Stress: Not on file    Relationships      Social connections:        Talks on phone: Not on file        Gets together: Not on file        Atten blood pressure is 131/87 and his pulse is 71. His respiration is 18 and oxygen saturation is 96%.     05/12/20  1252 05/15/20  0617   BP:  131/87   Pulse:  71   Resp:  18   Temp:  98.3 °F (36.8 °C)   TempSrc:  Oral   SpO2:  96%   Weight: 117.9 kg (260 lb) 1

## 2020-05-15 NOTE — BRIEF OP NOTE
Pre-Operative Diagnosis: squamous cell carcinoma     Post-Operative Diagnosis: squamous cell carcinoma      Procedure Performed:   Procedure(s):  wide excision lesion(s) right leg, frozen section, flap reconstruction    Surgeon(s) and Role:     * Naida

## 2020-05-15 NOTE — ANESTHESIA POSTPROCEDURE EVALUATION
Patient: Poly Hathaway    Procedure Summary     Date:  05/15/20 Room / Location:  [facility] OR 01 / [facility] OR    Anesthesia Start:  1846 Anesthesia Stop:  9148    Procedure:  EXCISION LESION LOWER EXTREMITY (Right ) Diagnosis:  (squamous cell carcinoma)

## 2020-05-15 NOTE — H&P
REFERRED BY:  Melinda Fofana     Pacemaker: No  Latex Allergy: no  Coumadin: No  Plavix: No  Other anticoagulants: No  Cardiac stents: No     HAND DOMINANCE: Right  Profession: Sales     RECONSTRUCTIVE HISTORY     SUN EXPOSURE  Current no  Past no  Sunburns carcinoma, leg 02/18/2020     Right lower medial leg   • Strabismus 2005, 2006     bilateral eye surgery x2          SURGICAL          Past Surgical History:   Procedure Laterality Date   • APPENDECTOMY   2010   • EYE SURGERY Bilateral 2005, 2006     x2 person, place, time, and situation, Appropriate mood and affect        Integument Physical Exam:         Right foot dorsalis pedis 2+, posterior tibial not palpable     Posterior le cm crusted lesion with minimal lax skin        ASSESSMENT/PLAN:      I

## 2020-05-16 ENCOUNTER — TELEPHONE (OUTPATIENT)
Dept: SURGERY | Facility: CLINIC | Age: 55
End: 2020-05-16

## 2020-05-16 NOTE — TELEPHONE ENCOUNTER
Left message for post-operative patient to please call the office with any questions and/or concerns. Reminded patient to schedule an RN appointment on 5/27. Dr. Yana Finn notified.

## 2020-05-27 ENCOUNTER — NURSE ONLY (OUTPATIENT)
Dept: SURGERY | Facility: CLINIC | Age: 55
End: 2020-05-27
Payer: COMMERCIAL

## 2020-05-27 DIAGNOSIS — Z48.02 ENCOUNTER FOR REMOVAL OF SUTURES: Primary | ICD-10-CM

## 2020-05-27 DIAGNOSIS — C44.722 SQUAMOUS CELL CARCINOMA OF SKIN OF RIGHT LOWER LIMB, INCLUDING HIP: ICD-10-CM

## 2020-05-27 RX ORDER — LISINOPRIL 10 MG/1
20 TABLET ORAL DAILY
COMMUNITY
Start: 2020-05-09 | End: 2020-07-10

## 2020-05-27 NOTE — PROGRESS NOTES
Surgery 1: R leg SCC / flap  - Date: 05/15/20  - Days Since: 12    Pt here for suture removal to right leg  Pt identified w/2 identifiers and orders verified. Pt presents w/steri-strip C/D/I. Pt denies c/o pain, use of analgesics, and s/s infection.   Jeremie

## 2020-06-25 ENCOUNTER — NURSE ONLY (OUTPATIENT)
Dept: SURGERY | Facility: CLINIC | Age: 55
End: 2020-06-25
Payer: COMMERCIAL

## 2020-06-25 ENCOUNTER — TELEPHONE (OUTPATIENT)
Dept: SURGERY | Facility: CLINIC | Age: 55
End: 2020-06-25

## 2020-06-25 DIAGNOSIS — Z48.01 ENCOUNTER FOR SURGICAL WOUND DRESSING CHANGE: Primary | ICD-10-CM

## 2020-06-25 NOTE — PROGRESS NOTES
Surgery 1: R leg SCC / flap  - Date: 05/15/20  - Days Since: 39    Pt here for scar to right leg  Pt denies s/s of infection   Pt states \"he bumped his leg on a chair at work yesterday and noticed change in color of scar\"  Incision appears to be healing

## 2020-06-25 NOTE — TELEPHONE ENCOUNTER
Spoke to patient. Patient had surgery on 5/15/2020 for a wide excision of right calf SCC. Patient states proximal incisional right calf has redness and swelling, and is scabbed over, denies drainage and pain.    Patient offered and agreed for an appoint

## 2020-07-03 RX ORDER — ATORVASTATIN CALCIUM 40 MG/1
TABLET, FILM COATED ORAL
Qty: 30 TABLET | Refills: 1 | Status: SHIPPED | OUTPATIENT
Start: 2020-07-03 | End: 2020-09-05

## 2020-07-10 ENCOUNTER — OFFICE VISIT (OUTPATIENT)
Dept: INTERNAL MEDICINE CLINIC | Facility: CLINIC | Age: 55
End: 2020-07-10
Payer: COMMERCIAL

## 2020-07-10 VITALS — DIASTOLIC BLOOD PRESSURE: 85 MMHG | SYSTOLIC BLOOD PRESSURE: 125 MMHG | HEART RATE: 73 BPM

## 2020-07-10 DIAGNOSIS — E78.5 HYPERLIPIDEMIA, UNSPECIFIED HYPERLIPIDEMIA TYPE: ICD-10-CM

## 2020-07-10 DIAGNOSIS — C44.722 SCC (SQUAMOUS CELL CARCINOMA), LEG, RIGHT: ICD-10-CM

## 2020-07-10 DIAGNOSIS — I10 ESSENTIAL HYPERTENSION: Primary | ICD-10-CM

## 2020-07-10 DIAGNOSIS — M10.9 GOUT, UNSPECIFIED CAUSE, UNSPECIFIED CHRONICITY, UNSPECIFIED SITE: ICD-10-CM

## 2020-07-10 PROCEDURE — 99213 OFFICE O/P EST LOW 20 MIN: CPT | Performed by: INTERNAL MEDICINE

## 2020-07-10 NOTE — PROGRESS NOTES
HPI:    Patient ID: Ulysses Brewer is a 60year old male. HPI  Patient is here for follow-up on chronic medical issues as listed below. Last seen here in December.   After that visit he was sent to dermatology for lesion on his elbow and on his right lo LESION LOWER EXTREMITY Right 5/15/2020    Performed by Adrienne Teague MD at [address]   • EYE SURGERY Bilateral 2005, 2006    x2   • SHOULDER SURG PROC UNLISTED Right     rotator cuff repair   • SKIN TISSUE REARRANGEMENT Right 05/15/2020    Wide has no cervical adenopathy. Neurological: He is alert. Skin: Skin is warm and dry. No rash noted. Psychiatric: He has a normal mood and affect.  Thought content normal.       Wt Readings from Last 6 Encounters:  05/15/20 : 280 lb (127 kg)  12/16/19 :

## 2020-07-22 ENCOUNTER — LAB ENCOUNTER (OUTPATIENT)
Dept: LAB | Facility: HOSPITAL | Age: 55
End: 2020-07-22
Attending: INTERNAL MEDICINE
Payer: COMMERCIAL

## 2020-07-22 DIAGNOSIS — Z01.818 PRE-OP TESTING: ICD-10-CM

## 2020-07-23 ENCOUNTER — TELEPHONE (OUTPATIENT)
Dept: GASTROENTEROLOGY | Facility: CLINIC | Age: 55
End: 2020-07-23

## 2020-07-23 LAB — SARS-COV-2 RNA RESP QL NAA+PROBE: NOT DETECTED

## 2020-07-23 NOTE — TELEPHONE ENCOUNTER
Patient called stating that he never got his prep instructions even though they were sent via [employer] when he scheduled on 4/24/2020 and it says that he viewed them on 5/8/2020 at 11:19am.    I reviewed instructions with patient via phone.   I also told him

## 2020-07-24 ENCOUNTER — HOSPITAL ENCOUNTER (OUTPATIENT)
Facility: HOSPITAL | Age: 55
Setting detail: HOSPITAL OUTPATIENT SURGERY
Discharge: HOME | End: 2020-07-24
Attending: INTERNAL MEDICINE | Admitting: INTERNAL MEDICINE
Payer: COMMERCIAL

## 2020-07-24 ENCOUNTER — ANESTHESIA (OUTPATIENT)
Dept: ENDOSCOPY | Facility: HOSPITAL | Age: 55
End: 2020-07-24
Payer: COMMERCIAL

## 2020-07-24 ENCOUNTER — ANESTHESIA EVENT (OUTPATIENT)
Dept: ENDOSCOPY | Facility: HOSPITAL | Age: 55
End: 2020-07-24
Payer: COMMERCIAL

## 2020-07-24 ENCOUNTER — TELEPHONE (OUTPATIENT)
Dept: GASTROENTEROLOGY | Facility: CLINIC | Age: 55
End: 2020-07-24

## 2020-07-24 VITALS
WEIGHT: 275 LBS | DIASTOLIC BLOOD PRESSURE: 96 MMHG | HEART RATE: 58 BPM | RESPIRATION RATE: 20 BRPM | OXYGEN SATURATION: 93 % | BODY MASS INDEX: 36.45 KG/M2 | SYSTOLIC BLOOD PRESSURE: 109 MMHG | HEIGHT: 73 IN

## 2020-07-24 DIAGNOSIS — Z12.11 SCREEN FOR COLON CANCER: ICD-10-CM

## 2020-07-24 DIAGNOSIS — Z01.818 PRE-OP TESTING: Primary | ICD-10-CM

## 2020-07-24 PROCEDURE — 45385 COLONOSCOPY W/LESION REMOVAL: CPT | Performed by: INTERNAL MEDICINE

## 2020-07-24 PROCEDURE — 0DBK8ZX EXCISION OF ASCENDING COLON, VIA NATURAL OR ARTIFICIAL OPENING ENDOSCOPIC, DIAGNOSTIC: ICD-10-PCS | Performed by: INTERNAL MEDICINE

## 2020-07-24 RX ORDER — LIDOCAINE HYDROCHLORIDE 10 MG/ML
INJECTION, SOLUTION EPIDURAL; INFILTRATION; INTRACAUDAL; PERINEURAL AS NEEDED
Status: DISCONTINUED | OUTPATIENT
Start: 2020-07-24 | End: 2020-07-24 | Stop reason: SURG

## 2020-07-24 RX ORDER — SODIUM CHLORIDE, SODIUM LACTATE, POTASSIUM CHLORIDE, CALCIUM CHLORIDE 600; 310; 30; 20 MG/100ML; MG/100ML; MG/100ML; MG/100ML
INJECTION, SOLUTION INTRAVENOUS CONTINUOUS
Status: DISCONTINUED | OUTPATIENT
Start: 2020-07-24 | End: 2020-07-24

## 2020-07-24 RX ADMIN — SODIUM CHLORIDE, SODIUM LACTATE, POTASSIUM CHLORIDE, CALCIUM CHLORIDE: 600; 310; 30; 20 INJECTION, SOLUTION INTRAVENOUS at 08:26:00

## 2020-07-24 RX ADMIN — SODIUM CHLORIDE, SODIUM LACTATE, POTASSIUM CHLORIDE, CALCIUM CHLORIDE: 600; 310; 30; 20 INJECTION, SOLUTION INTRAVENOUS at 08:48:00

## 2020-07-24 RX ADMIN — LIDOCAINE HYDROCHLORIDE 25 MG: 10 INJECTION, SOLUTION EPIDURAL; INFILTRATION; INTRACAUDAL; PERINEURAL at 08:29:00

## 2020-07-24 NOTE — ANESTHESIA PREPROCEDURE EVALUATION
Anesthesia PreOp Note    HPI:     Donaldo Mcdonald is a 60year old male who presents for preoperative consultation requested by: Anitha Rodriguez MD    Date of Surgery: 7/24/2020    Procedure(s):  COLONOSCOPY  Indication: Screen for colon cancer    Relevan Nga Jones MD at [facility] MAIN OR   • EYE SURGERY Bilateral 2005, 2006    x2   • SHOULDER SURG PROC UNLISTED Right     rotator cuff repair   • SKIN TISSUE REARRANGEMENT Right 05/15/2020    Wide excision of Right leg SCC / flap reconstruction session: Not on file      Stress: Not on file    Relationships      Social connections:        Talks on phone: Not on file        Gets together: Not on file        Attends Buddhism service: Not on file        Active member of club or organization: Not on complications   Airway   Mallampati: III  TM distance: >3 FB  Neck ROM: full  Dental - normal exam     Pulmonary - negative ROS    breath sounds clear to auscultation  Cardiovascular   Exercise tolerance: good  (+) hypertension well controlled,     ECG rev

## 2020-07-24 NOTE — TELEPHONE ENCOUNTER
----- Message from Jennifer Reyez MD sent at 7/24/2020  3:17 PM CDT -----  I wanted to get back to you with your colonoscopy results. You had one colon polyp removed which was benign.   I would advise a repeat colonoscopy in 7 years to make sure no new p

## 2020-07-24 NOTE — ANESTHESIA POSTPROCEDURE EVALUATION
Patient: Emmanuel Larson    Procedure Summary     Date:  07/24/20 Room / Location:  [address] [place] [address] ENDOSCOPY 01 / [address] [place] [address] ENDOSCOPY    Anesthesia Start:  9402 Anesthesia Stop:  2914    Procedure:  COLONOSCOPY (N/A ) Diagnosis:       Screen for colon cancer      (Polyp, he

## 2020-07-24 NOTE — OPERATIVE REPORT
[facility] Endoscopy Report      Preoperative Diagnosis:  - colon cancer screening      Postoperative Diagnosis:  - colon polyp x 1  - internal hemorrhoids      Procedure:    Colonoscopy         Surgeon:  Tito Collazo M.D.     Anesthesia:

## 2020-07-24 NOTE — H&P
History & Physical Examination    Patient Name: Олег Muro  MRN: [id number]  CSN: [id number]  YOB: 1965    Diagnosis: colon cancer screening      ATORVASTATIN 40 MG Oral Tab, TAKE 1 TABLET(40 MG) BY MOUTH EVERY DAY, Disp: 30 tablet, Rfl: Cataracts Sister    • Cataracts Brother      Social History    Tobacco Use      Smoking status: Never Smoker      Smokeless tobacco: Former User    Alcohol use:  Yes      Alcohol/week: 0.0 standard drinks      Frequency: 2-4 times a month      Comment: 2 be

## 2020-07-24 NOTE — TELEPHONE ENCOUNTER
Patient viewed below result note in [address] on 7/24/2020 at 4:10pm    Health Maintenance updated. 7 year colonoscopy recall entered into patient outreach,  Next due 7/24/2027.

## 2020-08-27 ENCOUNTER — OFFICE VISIT (OUTPATIENT)
Dept: DERMATOLOGY CLINIC | Facility: CLINIC | Age: 55
End: 2020-08-27
Payer: COMMERCIAL

## 2020-08-27 DIAGNOSIS — D22.9 MULTIPLE BENIGN NEVI: ICD-10-CM

## 2020-08-27 DIAGNOSIS — L81.4 SOLAR LENTIGO: ICD-10-CM

## 2020-08-27 DIAGNOSIS — Z85.828 HISTORY OF SCC (SQUAMOUS CELL CARCINOMA) OF SKIN: ICD-10-CM

## 2020-08-27 DIAGNOSIS — L82.1 SEBORRHEIC KERATOSES: ICD-10-CM

## 2020-08-27 DIAGNOSIS — L92.0 GRANULOMA ANNULARE: ICD-10-CM

## 2020-08-27 DIAGNOSIS — L57.0 ACTINIC KERATOSIS: Primary | ICD-10-CM

## 2020-08-27 DIAGNOSIS — L57.8 SUN-DAMAGED SKIN: ICD-10-CM

## 2020-08-27 PROCEDURE — 99213 OFFICE O/P EST LOW 20 MIN: CPT | Performed by: DERMATOLOGY

## 2020-08-27 RX ORDER — LISINOPRIL 10 MG/1
20 TABLET ORAL DAILY
COMMUNITY
Start: 2020-08-02 | End: 2021-01-20 | Stop reason: ALTCHOICE

## 2020-08-27 NOTE — PROGRESS NOTES
HPI:     Chief Complaint     Full Skin Exam        HPI     Full Skin Exam      Additional comments: LOV 2/25/20. pt presenting today with full body skin check.  pt has HX of  Ak's,SCC          Last edited by SOLEDAD Ruiz on 8/27/2020  1:48 PM. (His boot, 2007   • Primary squamous cell carcinoma of skin of right lower extremity 03/02/2020    Wide excision of Right leg SCC / flap reconstruction    • Skin cancer 02/2020    Squamous cell carcinoma - left forearm   • Squamous cell carcinoma, leg 02/18/202 on file        Active member of club or organization: Not on file        Attends meetings of clubs or organizations: Not on file        Relationship status: Not on file      Intimate partner violence:        Fear of current or ex partner: Not on file alert, oriented, and appears their stated age. Patient is well nourished and in no distress    The exam was remarkable for the following:  - there is no evidence of recurrent squamous cell carcinoma from the left forearm, or the right lower medial leg.   -

## 2020-09-05 RX ORDER — ATORVASTATIN CALCIUM 40 MG/1
TABLET, FILM COATED ORAL
Qty: 30 TABLET | Refills: 1 | Status: SHIPPED | OUTPATIENT
Start: 2020-09-05 | End: 2020-11-03

## 2020-11-03 RX ORDER — ATORVASTATIN CALCIUM 40 MG/1
TABLET, FILM COATED ORAL
Qty: 30 TABLET | Refills: 5 | Status: SHIPPED | OUTPATIENT
Start: 2020-11-03 | End: 2021-05-21

## 2020-11-06 RX ORDER — LISINOPRIL 10 MG/1
TABLET ORAL
Qty: 60 TABLET | Refills: 5 | Status: SHIPPED | OUTPATIENT
Start: 2020-11-06 | End: 2021-05-21

## 2020-11-17 ENCOUNTER — PATIENT MESSAGE (OUTPATIENT)
Dept: INTERNAL MEDICINE CLINIC | Facility: CLINIC | Age: 55
End: 2020-11-17

## 2020-11-17 ENCOUNTER — MED REC SCAN ONLY (OUTPATIENT)
Dept: INTERNAL MEDICINE CLINIC | Facility: CLINIC | Age: 55
End: 2020-11-17

## 2020-11-18 NOTE — TELEPHONE ENCOUNTER
FORM SIGNED AND RETURNED TO NURSING STATION, NEEDS PATIENTS SIGNATURE. PATIENT CONTATCED AND INFORMED STATES THAT HE WILL  FORM. FORM PLACED AT [facility] OF THE [facility] 2ND FLOOR  FOR .

## 2021-01-20 ENCOUNTER — LAB ENCOUNTER (OUTPATIENT)
Dept: LAB | Facility: HOSPITAL | Age: 56
End: 2021-01-20
Attending: INTERNAL MEDICINE

## 2021-01-20 ENCOUNTER — OFFICE VISIT (OUTPATIENT)
Dept: INTERNAL MEDICINE CLINIC | Facility: CLINIC | Age: 56
End: 2021-01-20
Payer: COMMERCIAL

## 2021-01-20 VITALS
WEIGHT: 275.69 LBS | DIASTOLIC BLOOD PRESSURE: 85 MMHG | BODY MASS INDEX: 36.54 KG/M2 | SYSTOLIC BLOOD PRESSURE: 126 MMHG | HEART RATE: 82 BPM | HEIGHT: 73 IN

## 2021-01-20 DIAGNOSIS — E78.5 HYPERLIPIDEMIA, UNSPECIFIED HYPERLIPIDEMIA TYPE: ICD-10-CM

## 2021-01-20 DIAGNOSIS — Z01.00 EYE EXAM, ROUTINE: ICD-10-CM

## 2021-01-20 DIAGNOSIS — I10 ESSENTIAL HYPERTENSION: ICD-10-CM

## 2021-01-20 DIAGNOSIS — Z00.00 ROUTINE PHYSICAL EXAMINATION: ICD-10-CM

## 2021-01-20 DIAGNOSIS — Z23 NEED FOR VACCINATION: ICD-10-CM

## 2021-01-20 DIAGNOSIS — Z00.00 ROUTINE PHYSICAL EXAMINATION: Primary | ICD-10-CM

## 2021-01-20 DIAGNOSIS — C44.722 SCC (SQUAMOUS CELL CARCINOMA), LEG, RIGHT: ICD-10-CM

## 2021-01-20 DIAGNOSIS — M10.9 GOUT, UNSPECIFIED CAUSE, UNSPECIFIED CHRONICITY, UNSPECIFIED SITE: ICD-10-CM

## 2021-01-20 LAB
ALBUMIN SERPL-MCNC: 4.3 G/DL (ref 3.4–5)
ALBUMIN/GLOB SERPL: 1 {RATIO} (ref 1–2)
ALP LIVER SERPL-CCNC: 98 U/L
ALT SERPL-CCNC: 52 U/L
ANION GAP SERPL CALC-SCNC: 7 MMOL/L (ref 0–18)
AST SERPL-CCNC: 37 U/L (ref 15–37)
BASOPHILS # BLD AUTO: 0.04 X10(3) UL (ref 0–0.2)
BASOPHILS NFR BLD AUTO: 0.6 %
BILIRUB SERPL-MCNC: 0.9 MG/DL (ref 0.1–2)
BUN BLD-MCNC: 14 MG/DL (ref 7–18)
BUN/CREAT SERPL: 11.7 (ref 10–20)
CALCIUM BLD-MCNC: 9.5 MG/DL (ref 8.5–10.1)
CHLORIDE SERPL-SCNC: 104 MMOL/L (ref 98–112)
CHOLEST SMN-MCNC: 161 MG/DL (ref ?–200)
CO2 SERPL-SCNC: 24 MMOL/L (ref 21–32)
COMPLEXED PSA SERPL-MCNC: 1.41 NG/ML (ref ?–4)
CREAT BLD-MCNC: 1.2 MG/DL
DEPRECATED RDW RBC AUTO: 40.3 FL (ref 35.1–46.3)
EOSINOPHIL # BLD AUTO: 0.12 X10(3) UL (ref 0–0.7)
EOSINOPHIL NFR BLD AUTO: 1.8 %
ERYTHROCYTE [DISTWIDTH] IN BLOOD BY AUTOMATED COUNT: 12.2 % (ref 11–15)
GLOBULIN PLAS-MCNC: 4.3 G/DL (ref 2.8–4.4)
GLUCOSE BLD-MCNC: 89 MG/DL (ref 70–99)
HCT VFR BLD AUTO: 44.8 %
HDLC SERPL-MCNC: 33 MG/DL (ref 40–59)
HGB BLD-MCNC: 15.4 G/DL
IMM GRANULOCYTES # BLD AUTO: 0.02 X10(3) UL (ref 0–1)
IMM GRANULOCYTES NFR BLD: 0.3 %
LDLC SERPL CALC-MCNC: 104 MG/DL (ref ?–100)
LYMPHOCYTES # BLD AUTO: 1.93 X10(3) UL (ref 1–4)
LYMPHOCYTES NFR BLD AUTO: 28.5 %
M PROTEIN MFR SERPL ELPH: 8.6 G/DL (ref 6.4–8.2)
MCH RBC QN AUTO: 31.2 PG (ref 26–34)
MCHC RBC AUTO-ENTMCNC: 34.4 G/DL (ref 31–37)
MCV RBC AUTO: 90.9 FL
MONOCYTES # BLD AUTO: 0.6 X10(3) UL (ref 0.1–1)
MONOCYTES NFR BLD AUTO: 8.8 %
NEUTROPHILS # BLD AUTO: 4.07 X10 (3) UL (ref 1.5–7.7)
NEUTROPHILS # BLD AUTO: 4.07 X10(3) UL (ref 1.5–7.7)
NEUTROPHILS NFR BLD AUTO: 60 %
NONHDLC SERPL-MCNC: 128 MG/DL (ref ?–130)
OSMOLALITY SERPL CALC.SUM OF ELEC: 280 MOSM/KG (ref 275–295)
PATIENT FASTING Y/N/NP: YES
PATIENT FASTING Y/N/NP: YES
PLATELET # BLD AUTO: 337 10(3)UL (ref 150–450)
POTASSIUM SERPL-SCNC: 4.1 MMOL/L (ref 3.5–5.1)
RBC # BLD AUTO: 4.93 X10(6)UL
SODIUM SERPL-SCNC: 135 MMOL/L (ref 136–145)
TRIGL SERPL-MCNC: 122 MG/DL (ref 30–149)
TSI SER-ACNC: 2.04 MIU/ML (ref 0.36–3.74)
URATE SERPL-MCNC: 8.4 MG/DL
VLDLC SERPL CALC-MCNC: 24 MG/DL (ref 0–30)
WBC # BLD AUTO: 6.8 X10(3) UL (ref 4–11)

## 2021-01-20 PROCEDURE — 99396 PREV VISIT EST AGE 40-64: CPT | Performed by: INTERNAL MEDICINE

## 2021-01-20 PROCEDURE — 84550 ASSAY OF BLOOD/URIC ACID: CPT

## 2021-01-20 PROCEDURE — 80061 LIPID PANEL: CPT

## 2021-01-20 PROCEDURE — 84443 ASSAY THYROID STIM HORMONE: CPT

## 2021-01-20 PROCEDURE — 85025 COMPLETE CBC W/AUTO DIFF WBC: CPT

## 2021-01-20 PROCEDURE — 80053 COMPREHEN METABOLIC PANEL: CPT

## 2021-01-20 PROCEDURE — 3079F DIAST BP 80-89 MM HG: CPT | Performed by: INTERNAL MEDICINE

## 2021-01-20 PROCEDURE — 3008F BODY MASS INDEX DOCD: CPT | Performed by: INTERNAL MEDICINE

## 2021-01-20 PROCEDURE — 90471 IMMUNIZATION ADMIN: CPT | Performed by: INTERNAL MEDICINE

## 2021-01-20 PROCEDURE — 3074F SYST BP LT 130 MM HG: CPT | Performed by: INTERNAL MEDICINE

## 2021-01-20 PROCEDURE — 36415 COLL VENOUS BLD VENIPUNCTURE: CPT

## 2021-01-20 PROCEDURE — 90686 IIV4 VACC NO PRSV 0.5 ML IM: CPT | Performed by: INTERNAL MEDICINE

## 2021-01-20 NOTE — PROGRESS NOTES
HPI:    Patient ID: Duong Gustafson is a 60year old male. HPI  Patient is here requesting a general physical exam as well as '[\for follow-up on chronic medical issues and with complains of some leg soreness. Last seen in the office on July 10.   No olu • COLONOSCOPY N/A 7/24/2020    Performed by Anitha Rodriguez MD at [facility] Right 5/15/2020    Performed by Prudence Avina MD at [facility] MAIN OR   • EYE SURGERY Bilateral 2005, 2006    x2   • SHOULDER SURG IA wheezes. He has no rales. Abdominal: Soft. Bowel sounds are normal. He exhibits no mass. There is no abdominal tenderness. Hernia confirmed negative in the right inguinal area and confirmed negative in the left inguinal area.    Genitourinary:    Testes a Eye exam, routine  Patient with history of some eye issues with correction of lazy eye. Referred to our ophthalmologist for evaluation. May have also had early glaucoma but is not certain.  - OPHTHALMOLOGY - INTERNAL    7.  Need for vaccination  Jillian Etienne

## 2021-02-23 ENCOUNTER — OFFICE VISIT (OUTPATIENT)
Dept: DERMATOLOGY CLINIC | Facility: CLINIC | Age: 56
End: 2021-02-23
Payer: COMMERCIAL

## 2021-02-23 DIAGNOSIS — L71.9 ROSACEA: ICD-10-CM

## 2021-02-23 DIAGNOSIS — D48.5 NEOPLASM OF UNCERTAIN BEHAVIOR OF SKIN: Primary | ICD-10-CM

## 2021-02-23 DIAGNOSIS — L29.8 XEROTIC ECZEMA: ICD-10-CM

## 2021-02-23 DIAGNOSIS — L81.4 SOLAR LENTIGO: ICD-10-CM

## 2021-02-23 DIAGNOSIS — L82.1 SEBORRHEIC KERATOSES: ICD-10-CM

## 2021-02-23 DIAGNOSIS — Z85.828 HISTORY OF SCC (SQUAMOUS CELL CARCINOMA) OF SKIN: ICD-10-CM

## 2021-02-23 DIAGNOSIS — L57.8 SUN-DAMAGED SKIN: ICD-10-CM

## 2021-02-23 DIAGNOSIS — L57.0 ACTINIC KERATOSIS: ICD-10-CM

## 2021-02-23 DIAGNOSIS — Z12.83 SKIN CANCER SCREENING: ICD-10-CM

## 2021-02-23 PROBLEM — L29.89 XEROTIC ECZEMA: Status: ACTIVE | Noted: 2021-02-23

## 2021-02-23 PROCEDURE — 99396 PREV VISIT EST AGE 40-64: CPT | Performed by: DERMATOLOGY

## 2021-02-23 PROCEDURE — 17000 DESTRUCT PREMALG LESION: CPT | Performed by: DERMATOLOGY

## 2021-02-23 PROCEDURE — 88305 TISSUE EXAM BY PATHOLOGIST: CPT | Performed by: DERMATOLOGY

## 2021-02-23 PROCEDURE — 17003 DESTRUCT PREMALG LES 2-14: CPT | Performed by: DERMATOLOGY

## 2021-02-23 PROCEDURE — 11102 TANGNTL BX SKIN SINGLE LES: CPT | Performed by: DERMATOLOGY

## 2021-02-23 NOTE — PROGRESS NOTES
HPI:     Chief Complaint     Full Skin Exam        HPI     Full Skin Exam      Additional comments: LOV 8/27/20. pt presenting today with full body skin check.  pt has HX of AK's,SCC          Last edited by SOLEDAD Calderon on 2/23/2021  9:48 AM. (Hist 2011    torn llt.  bicep - ice hockey injury; surgical repair   • Cataract     monitor   • Gout    • High blood pressure    • High cholesterol    • Lipid screening 2/20/2014   • Metatarsal bone fracture     left 4th metatarsal break - walking boot, 2007   • Days per week: Not on file        Minutes per session: Not on file      Stress: Not on file    Relationships      Social connections        Talks on phone: Not on file        Gets together: Not on file        Attends Hindu service: Not on file        A Physical Exam  A complete body exam was performed, including face, neck, chest , back, abdomen, r upper extremity, l upper extremity, buttocks, genital area, l lower extremity and right lower extremity, and scalp    The patient is alert, oriented, and ap understands to put it on  15-20 minutes before outsides so that  it is absorbed and working and to reapply it every few hours. History of scc (squamous cell carcinoma) of skin-patient will do monthly self exams.   He will follow-up for another body check

## 2021-03-01 NOTE — PROGRESS NOTES
Patient informed of test results and all LSS' recommendations. Voiced understanding. Results logged in. He states he will CB for F/U.

## 2021-03-26 ENCOUNTER — IMMUNIZATION (OUTPATIENT)
Dept: LAB | Facility: HOSPITAL | Age: 56
End: 2021-03-26
Attending: HOSPITALIST
Payer: COMMERCIAL

## 2021-03-26 DIAGNOSIS — Z23 NEED FOR VACCINATION: Primary | ICD-10-CM

## 2021-03-26 PROCEDURE — 0011A SARSCOV2 VAC 100MCG/0.5ML IM: CPT

## 2021-03-29 ENCOUNTER — OFFICE VISIT (OUTPATIENT)
Dept: DERMATOLOGY CLINIC | Facility: CLINIC | Age: 56
End: 2021-03-29
Payer: COMMERCIAL

## 2021-03-29 DIAGNOSIS — L57.0 ACTINIC KERATOSIS: Primary | ICD-10-CM

## 2021-03-29 PROCEDURE — 17000 DESTRUCT PREMALG LESION: CPT | Performed by: DERMATOLOGY

## 2021-03-29 NOTE — PROGRESS NOTES
HPI:     Chief Complaint     Derm Problem        HPI     Derm Problem      Additional comments: LOV 2/23/2021 - Pt presenting for 4 week follow up to reassess AK on left forehead for further treatment. Pt has personal hx of AKs and SCC.           Last edit LESION LOWER EXTREMITY Right 5/15/2020    Performed by Allison Guzman MD at [facility]   • EYE SURGERY Bilateral 2005, 2006    x2   • SHOULDER SURG PROC UNLISTED Right     rotator cuff repair   • SKIN TISSUE REARRANGEMENT Right 05/15/2020    Wide History Narrative      Not on file    Social Determinants of Health  Financial Resource Strain:       Difficulty of Paying Living Expenses:   Food Insecurity:       Worried About [address] in the Last Year:       Ran Out of Food in the Last Year: Orders:  No orders of the defined types were placed in this encounter.         3/29/2021  Nanda Hutchins

## 2021-04-23 ENCOUNTER — IMMUNIZATION (OUTPATIENT)
Dept: LAB | Facility: HOSPITAL | Age: 56
End: 2021-04-23
Attending: EMERGENCY MEDICINE
Payer: COMMERCIAL

## 2021-04-23 DIAGNOSIS — Z23 NEED FOR VACCINATION: Primary | ICD-10-CM

## 2021-04-23 PROCEDURE — 0012A SARSCOV2 VAC 100MCG/0.5ML IM: CPT

## 2021-05-21 RX ORDER — ATORVASTATIN CALCIUM 40 MG/1
TABLET, FILM COATED ORAL
Qty: 30 TABLET | Refills: 5 | Status: SHIPPED | OUTPATIENT
Start: 2021-05-21 | End: 2021-12-28

## 2021-05-21 RX ORDER — LISINOPRIL 10 MG/1
TABLET ORAL
Qty: 60 TABLET | Refills: 5 | Status: SHIPPED | OUTPATIENT
Start: 2021-05-21 | End: 2021-12-16

## 2021-06-24 ENCOUNTER — OFFICE VISIT (OUTPATIENT)
Dept: DERMATOLOGY CLINIC | Facility: CLINIC | Age: 56
End: 2021-06-24
Payer: COMMERCIAL

## 2021-06-24 DIAGNOSIS — L57.8 SUN-DAMAGED SKIN: ICD-10-CM

## 2021-06-24 DIAGNOSIS — D18.01 HEMANGIOMA OF SKIN AND SUBCUTANEOUS TISSUE: ICD-10-CM

## 2021-06-24 DIAGNOSIS — D22.9 MULTIPLE BENIGN NEVI: ICD-10-CM

## 2021-06-24 DIAGNOSIS — L71.9 ROSACEA: ICD-10-CM

## 2021-06-24 DIAGNOSIS — L82.1 SEBORRHEIC KERATOSES: ICD-10-CM

## 2021-06-24 DIAGNOSIS — L81.4 SOLAR LENTIGO: ICD-10-CM

## 2021-06-24 DIAGNOSIS — Z85.828 HISTORY OF SCC (SQUAMOUS CELL CARCINOMA) OF SKIN: ICD-10-CM

## 2021-06-24 DIAGNOSIS — L57.0 ACTINIC KERATOSIS: Primary | ICD-10-CM

## 2021-06-24 DIAGNOSIS — L91.8 SKIN TAG: ICD-10-CM

## 2021-06-24 PROCEDURE — 17000 DESTRUCT PREMALG LESION: CPT | Performed by: DERMATOLOGY

## 2021-06-24 PROCEDURE — 99213 OFFICE O/P EST LOW 20 MIN: CPT | Performed by: DERMATOLOGY

## 2021-06-24 NOTE — PROGRESS NOTES
HPI:     Chief Complaint     Full Skin Exam        HPI     Full Skin Exam      Additional comments: LOV 3/29/2021 Patient present for full body skin exam . Patient has hx of SCC AK          Last edited by Juancarlos Lisa, [address] on 6/24/2021  2:34 PM. (Histor SCC / flap reconstruction    • Skin cancer 02/2020    Squamous cell carcinoma - left forearm   • Squamous cell carcinoma, leg 02/18/2020    Right lower medial leg   • Strabismus 2005, 2006    bilateral eye surgery x2     Past Surgical History:   Procedure Currently spends a great deal of time in the sun: Not Asked        Past Sunlamp Treatments for Acne: Not Asked        Hx of Spending [place] of Time in [place]: Not Asked        Bad sunburns in the past: Not Asked        Tanning Salons in the Past: Not Aske carcinoma from the posterior medial right leg or the left forearm.-Also no evidence of recurrent hypertrophic actinic keratosis  - melanocytic nevi are uniform in color, shape and borders  -there are scattered blotchy brown macules on face, trunk and extre types were placed in this encounter.         6/24/2021  Matilde Boudreaux

## 2021-09-02 ENCOUNTER — PATIENT MESSAGE (OUTPATIENT)
Dept: INTERNAL MEDICINE CLINIC | Facility: CLINIC | Age: 56
End: 2021-09-02

## 2021-09-04 NOTE — TELEPHONE ENCOUNTER
From: Manuel Juan  To: Miguel Rubio MD  Sent: 9/2/2021 8:04 PM CDT  Subject: Non-Urgent Medical Question    Dr Shira Malloy - can you complete the attached for our insurance carrier.     thank you  Edouard Bell

## 2021-09-07 ENCOUNTER — MED REC SCAN ONLY (OUTPATIENT)
Dept: INTERNAL MEDICINE CLINIC | Facility: CLINIC | Age: 56
End: 2021-09-07

## 2021-09-13 NOTE — TELEPHONE ENCOUNTER
Patient contacted and states will  form/Pampered  at [facility]. Form left at . Copy sent to Medical records.

## 2021-11-06 ENCOUNTER — HOSPITAL ENCOUNTER (OUTPATIENT)
Age: 56
Discharge: HOME | End: 2021-11-06
Payer: COMMERCIAL

## 2021-11-06 ENCOUNTER — TELEPHONE (OUTPATIENT)
Dept: INTERNAL MEDICINE CLINIC | Facility: CLINIC | Age: 56
End: 2021-11-06

## 2021-11-06 ENCOUNTER — PATIENT MESSAGE (OUTPATIENT)
Dept: INTERNAL MEDICINE CLINIC | Facility: CLINIC | Age: 56
End: 2021-11-06

## 2021-11-06 VITALS
HEIGHT: 73 IN | TEMPERATURE: 99 F | SYSTOLIC BLOOD PRESSURE: 115 MMHG | HEART RATE: 60 BPM | DIASTOLIC BLOOD PRESSURE: 68 MMHG | OXYGEN SATURATION: 100 % | BODY MASS INDEX: 32.47 KG/M2 | RESPIRATION RATE: 18 BRPM | WEIGHT: 245 LBS

## 2021-11-06 DIAGNOSIS — Z20.822 ENCOUNTER FOR LABORATORY TESTING FOR COVID-19 VIRUS: Primary | ICD-10-CM

## 2021-11-06 DIAGNOSIS — U07.1 COVID-19: ICD-10-CM

## 2021-11-06 PROCEDURE — A9150 MISC/EXPER NON-PRESCRIPT DRU: HCPCS | Performed by: NURSE PRACTITIONER

## 2021-11-06 PROCEDURE — 99214 OFFICE O/P EST MOD 30 MIN: CPT | Performed by: NURSE PRACTITIONER

## 2021-11-06 PROCEDURE — U0002 COVID-19 LAB TEST NON-CDC: HCPCS | Performed by: NURSE PRACTITIONER

## 2021-11-06 PROCEDURE — M0243 CASIRIVI AND IMDEVI INFUSION: HCPCS | Performed by: NURSE PRACTITIONER

## 2021-11-06 RX ORDER — DEXTROMETHORPHAN HYDROBROMIDE AND PROMETHAZINE HYDROCHLORIDE 15; 6.25 MG/5ML; MG/5ML
5 SYRUP ORAL 4 TIMES DAILY PRN
Qty: 118 ML | Refills: 0 | Status: SHIPPED | OUTPATIENT
Start: 2021-11-06

## 2021-11-06 RX ORDER — CYCLOBENZAPRINE HCL 10 MG
10 TABLET ORAL 3 TIMES DAILY PRN
Qty: 20 TABLET | Refills: 0 | Status: SHIPPED | OUTPATIENT
Start: 2021-11-06 | End: 2021-11-13

## 2021-11-06 RX ORDER — IBUPROFEN 600 MG/1
600 TABLET ORAL EVERY 8 HOURS PRN
Qty: 30 TABLET | Refills: 0 | Status: SHIPPED | OUTPATIENT
Start: 2021-11-06 | End: 2021-11-13

## 2021-11-06 RX ORDER — ACETAMINOPHEN 500 MG
1000 TABLET ORAL ONCE
Status: COMPLETED | OUTPATIENT
Start: 2021-11-06 | End: 2021-11-06

## 2021-11-06 NOTE — ED PROVIDER NOTES
Patient Seen in: [facility]      History   Patient presents with:  Headache    Stated Complaint: covid test    Subjective:   Dex Galvan is a 60year-old male who [facility] the [facility] for COVID testing.  Patient was notified yeste SKIN TISSUE REARRANGEMENT Right 05/15/2020    Wide excision of Right leg SCC / flap reconstruction                 Social History    Tobacco Use      Smoking status: Never Smoker      Smokeless tobacco: Former User    Vaping Use      Vaping Use: Never used to light. Cardiovascular:      Rate and Rhythm: Normal rate and regular rhythm. Pulses: Normal pulses. Heart sounds: Normal heart sounds. Pulmonary:      Effort: Pulmonary effort is normal. No respiratory distress.       Breath sounds: Normal Patient stable for discharge. RX symptom management. ER precautions discussed. Patient is afebrile, nontoxic in appearance without signs of clinical deterioration.  Patient has no evidence of any emergent medical condition at this time which would Moustaphayohana Corcoranes

## 2021-11-06 NOTE — TELEPHONE ENCOUNTER
----- Message from Hurley Duverney sent at 11/6/2021  7:48 AM CDT -----  Regarding: Exposed to Covid  Hello    I had a co-worker test positive for Covid. His test results came bacK last night at 4 pm. I worked closely with him all week.  About 6 pm last ni

## 2021-11-06 NOTE — TELEPHONE ENCOUNTER
Patient requesting rapid Covid test, advised we only order PCR outpatient. Advised may go to any public site offering rapid test or go to [facility], patient reports will go to [facility].

## 2021-11-06 NOTE — TELEPHONE ENCOUNTER
See acute encounter 11/6/21. From: Caroline Escamilla  To: Yasmeen Rice MD  Sent: 11/6/2021  7:48 AM CDT  Subject: Exposed to Covid    Hello    I had a co-worker test positive for Covid.   His test results came bacK last night at 4 pm. I worked closely w

## 2021-11-08 ENCOUNTER — TELEPHONE (OUTPATIENT)
Dept: INTERNAL MEDICINE CLINIC | Facility: CLINIC | Age: 56
End: 2021-11-08

## 2021-11-08 NOTE — TELEPHONE ENCOUNTER
Pt received PAB infusion at [facility] on 11/6 for COVID-19. Please follow-up with pt for post-infusion assessment and home monitoring if needed. Thank you.

## 2021-11-08 NOTE — TELEPHONE ENCOUNTER
What  was your temp today? -   Never had a fever. Denies chills/sweats today  Had the chills and sweats on Friday and Saturday    How did you take your temp? N/A    What was your pulse ox today? N/A  Are you feeling short of breath today?

## 2021-12-16 ENCOUNTER — OFFICE VISIT (OUTPATIENT)
Dept: DERMATOLOGY CLINIC | Facility: CLINIC | Age: 56
End: 2021-12-16
Payer: COMMERCIAL

## 2021-12-16 DIAGNOSIS — L57.8 SUN-DAMAGED SKIN: ICD-10-CM

## 2021-12-16 DIAGNOSIS — L82.0 INFLAMED SEBORRHEIC KERATOSIS: ICD-10-CM

## 2021-12-16 DIAGNOSIS — L81.4 SOLAR LENTIGO: ICD-10-CM

## 2021-12-16 DIAGNOSIS — L82.1 SEBORRHEIC KERATOSES: ICD-10-CM

## 2021-12-16 DIAGNOSIS — Z12.83 SKIN CANCER SCREENING: Primary | ICD-10-CM

## 2021-12-16 DIAGNOSIS — D22.9 MULTIPLE BENIGN NEVI: ICD-10-CM

## 2021-12-16 DIAGNOSIS — Z87.2 HISTORY OF ACTINIC KERATOSES: ICD-10-CM

## 2021-12-16 DIAGNOSIS — L91.8 SKIN TAG: ICD-10-CM

## 2021-12-16 DIAGNOSIS — D18.01 HEMANGIOMA OF SKIN AND SUBCUTANEOUS TISSUE: ICD-10-CM

## 2021-12-16 DIAGNOSIS — Z85.828 HISTORY OF SCC (SQUAMOUS CELL CARCINOMA) OF SKIN: ICD-10-CM

## 2021-12-16 PROCEDURE — 17110 DESTRUCTION B9 LES UP TO 14: CPT | Performed by: DERMATOLOGY

## 2021-12-16 PROCEDURE — 99396 PREV VISIT EST AGE 40-64: CPT | Performed by: DERMATOLOGY

## 2021-12-16 RX ORDER — LISINOPRIL 10 MG/1
TABLET ORAL
Qty: 60 TABLET | Refills: 1 | Status: SHIPPED | OUTPATIENT
Start: 2021-12-16

## 2021-12-16 NOTE — PROGRESS NOTES
HPI:     Chief Complaint     Full Skin Exam        HPI     Full Skin Exam      Additional comments: LOV 6/24/21 Patient present for full body skin exam .Patient has hx  of SCC AK          Last edited by Michelle Luciano on 12/16/2021  3:44 PM. (History • Metatarsal bone fracture     left 4th metatarsal break - walking boot, 2007   • Primary squamous cell carcinoma of skin of right lower extremity 03/02/2020    Wide excision of Right leg SCC / flap reconstruction    • Skin cancer 02/2020    Squamous nicci farm: Not Asked        History of tanning: Not Asked        Outdoor occupation: Not Asked        Reaction to local anesthetic: No        Left Handed: Not Asked        Right Handed: Yes        Currently spends a great deal of time in the sun: Not Asked papules  - there are numerous brown stuck on keratoses. ASSESSMENT/PLAN:   Skin cancer screening  (primary encounter diagnosis)-no new lesions today suspicious for carcinoma.   In light of her recent his history is of the large squamous cell on the l

## 2021-12-28 RX ORDER — ATORVASTATIN CALCIUM 40 MG/1
TABLET, FILM COATED ORAL
Qty: 30 TABLET | Refills: 0 | Status: SHIPPED | OUTPATIENT
Start: 2021-12-28 | End: 2022-01-25

## 2022-01-25 RX ORDER — ATORVASTATIN CALCIUM 40 MG/1
TABLET, FILM COATED ORAL
Qty: 30 TABLET | Refills: 0 | Status: SHIPPED | OUTPATIENT
Start: 2022-01-25

## 2022-01-25 NOTE — TELEPHONE ENCOUNTER
Protocol failed or has No Protocol, please review  Requested Prescriptions   Pending Prescriptions Disp Refills    ATORVASTATIN 40 MG Oral Tab [Pharmacy Med Name: Atorvastatin Calcium 40 Mg Tab Nort] 30 tablet 0     Sig: TAKE ONE TABLET BY MOUTH ONE TIME D

## 2022-02-05 ENCOUNTER — IMMUNIZATION (OUTPATIENT)
Dept: LAB | Age: 57
End: 2022-02-05
Attending: EMERGENCY MEDICINE
Payer: COMMERCIAL

## 2022-02-05 DIAGNOSIS — Z23 NEED FOR VACCINATION: Primary | ICD-10-CM

## 2022-02-05 PROCEDURE — 0064A SARSCOV2 VAC 50MCG/0.25ML IM: CPT

## 2022-02-10 RX ORDER — LISINOPRIL 10 MG/1
TABLET ORAL
Qty: 60 TABLET | Refills: 0 | Status: SHIPPED | OUTPATIENT
Start: 2022-02-10 | End: 2022-03-20

## 2022-02-10 NOTE — TELEPHONE ENCOUNTER
Please review. Protocol failed / No protocol. Requested Prescriptions   Pending Prescriptions Disp Refills    LISINOPRIL 10 MG Oral Tab [Pharmacy Med Name: Lisinopril 10 Mg Tab Solc] 60 tablet 0     Sig: TAKE TWO TABLETS BY MOUTH DAILY        Hypertensive Medications Protocol Failed - 2/10/2022  1:31 AM        Failed - CMP or BMP in past 12 months        Passed - Appointment in past 6 or next 3 months        Passed - GFR Non- > 50     Lab Results   Component Value Date    [place] [address] 01/20/2021                       Future Appointments         Provider Department Appt Notes    In 3 weeks Thiago Lema MD [facility], [facility] Maybe get a stress test done on my heart? Good idea?     In 4 months Myra Cordero MD [facility] full body Dr Jessica Stephen pt            Recent Outpatient Visits              1 month ago Skin cancer screening    [facility] Dermatology Malgorzata Nugent MD    Office Visit    7 months ago Actinic keratosis    [facility] Derm[facility] Malgorzata Nugent MD    Office Visit    10 months ago Actinic keratosis    [facility] Malgorazta Nugent MD    Office Visit    11 months ago Neoplasm of uncertain behavior of skin    [facility] Malgorzata Nugent MD    Office Visit    1 year ago Routine physical examination    [facility], Benedicto Noriega MD    Office Visit

## 2022-02-28 RX ORDER — ATORVASTATIN CALCIUM 40 MG/1
TABLET, FILM COATED ORAL
Qty: 90 TABLET | Refills: 0 | Status: SHIPPED | OUTPATIENT
Start: 2022-02-28

## 2022-02-28 NOTE — TELEPHONE ENCOUNTER
Please review. Protocol failed/ No protocol. Requested Prescriptions   Pending Prescriptions Disp Refills    ATORVASTATIN 40 MG Oral Tab [Pharmacy Med Name: Atorvastatin Calcium 40 Mg Tab Nort] 30 tablet 0     Sig: TAKE ONE TABLET BY MOUTH ONE TIME DAILY        Cholesterol Medication Protocol Failed - 2/26/2022  4:11 PM        Failed - ALT in past 12 months        Failed - LDL in past 12 months        Failed - Last ALT < 80       Lab Results   Component Value Date    ALT 52 01/20/2021             Failed - Last LDL < 130     Lab Results   Component Value Date     (H) 01/20/2021               Passed - Appointment in past 12 or next 3 months              Recent Outpatient Visits              2 months ago Skin cancer screening    [facility] Dermatology Delfina Bowman MD    Office Visit    8 months ago Actinic keratosis    [facility]erm[facility] Delfina Bowman MD    Office Visit    11 months ago Actinic keratosis    [facility] Delfina Bowman MD    Office Visit    1 year ago Neoplasm of uncertain behavior of skin    [facility] Delfina Bowman MD    Office Visit    1 year ago Routine physical examination    [facility], [facility], [address], Lula Lee MD    Office Visit            Future Appointments         Provider Department Appt Notes    In 1 week Doroteo Hathaway MD [facility], [facility], [address] Maybe get a stress test done on my heart? Good idea?     In 3 months Bhupinder Evangelista MD [facility] Derm[facility]ology full body Dr Clifton Altamirano pt

## 2022-03-01 ENCOUNTER — LAB ENCOUNTER (OUTPATIENT)
Dept: LAB | Facility: HOSPITAL | Age: 57
End: 2022-03-01
Attending: INTERNAL MEDICINE
Payer: COMMERCIAL

## 2022-03-01 DIAGNOSIS — Z00.00 ROUTINE PHYSICAL EXAMINATION: ICD-10-CM

## 2022-03-01 LAB
ALBUMIN SERPL-MCNC: 4.1 G/DL (ref 3.4–5)
ALBUMIN/GLOB SERPL: 1.1 {RATIO} (ref 1–2)
ALP LIVER SERPL-CCNC: 89 U/L
ALT SERPL-CCNC: 33 U/L
ANION GAP SERPL CALC-SCNC: 6 MMOL/L (ref 0–18)
AST SERPL-CCNC: 33 U/L (ref 15–37)
BASOPHILS # BLD AUTO: 0.04 X10(3) UL (ref 0–0.2)
BASOPHILS NFR BLD AUTO: 0.6 %
BILIRUB SERPL-MCNC: 1.1 MG/DL (ref 0.1–2)
BUN BLD-MCNC: 17 MG/DL (ref 7–18)
CALCIUM BLD-MCNC: 9.6 MG/DL (ref 8.5–10.1)
CHLORIDE SERPL-SCNC: 101 MMOL/L (ref 98–112)
CHOLEST SERPL-MCNC: 213 MG/DL (ref ?–200)
CO2 SERPL-SCNC: 27 MMOL/L (ref 21–32)
COMPLEXED PSA SERPL-MCNC: 0.57 NG/ML (ref ?–4)
CREAT BLD-MCNC: 1.26 MG/DL
DEPRECATED RDW RBC AUTO: 46.5 FL (ref 35.1–46.3)
EOSINOPHIL # BLD AUTO: 0.26 X10(3) UL (ref 0–0.7)
EOSINOPHIL NFR BLD AUTO: 4.1 %
ERYTHROCYTE [DISTWIDTH] IN BLOOD BY AUTOMATED COUNT: 12.9 % (ref 11–15)
FASTING PATIENT LIPID ANSWER: YES
FASTING STATUS PATIENT QL REPORTED: YES
GLOBULIN PLAS-MCNC: 3.6 G/DL (ref 2.8–4.4)
GLUCOSE BLD-MCNC: 83 MG/DL (ref 70–99)
HCT VFR BLD AUTO: 45.8 %
HDLC SERPL-MCNC: 42 MG/DL (ref 40–59)
HGB BLD-MCNC: 15.3 G/DL
IMM GRANULOCYTES # BLD AUTO: 0.02 X10(3) UL (ref 0–1)
IMM GRANULOCYTES NFR BLD: 0.3 %
LDLC SERPL CALC-MCNC: 146 MG/DL (ref ?–100)
LYMPHOCYTES # BLD AUTO: 2.34 X10(3) UL (ref 1–4)
LYMPHOCYTES NFR BLD AUTO: 36.9 %
MCH RBC QN AUTO: 32.8 PG (ref 26–34)
MCHC RBC AUTO-ENTMCNC: 33.4 G/DL (ref 31–37)
MONOCYTES # BLD AUTO: 0.64 X10(3) UL (ref 0.1–1)
MONOCYTES NFR BLD AUTO: 10.1 %
NEUTROPHILS # BLD AUTO: 3.05 X10 (3) UL (ref 1.5–7.7)
NEUTROPHILS # BLD AUTO: 3.05 X10(3) UL (ref 1.5–7.7)
NEUTROPHILS NFR BLD AUTO: 48 %
NONHDLC SERPL-MCNC: 171 MG/DL (ref ?–130)
OSMOLALITY SERPL CALC.SUM OF ELEC: 279 MOSM/KG (ref 275–295)
PLATELET # BLD AUTO: 262 10(3)UL (ref 150–450)
POTASSIUM SERPL-SCNC: 4.7 MMOL/L (ref 3.5–5.1)
PROT SERPL-MCNC: 7.7 G/DL (ref 6.4–8.2)
RBC # BLD AUTO: 4.67 X10(6)UL
SODIUM SERPL-SCNC: 134 MMOL/L (ref 136–145)
TRIGL SERPL-MCNC: 139 MG/DL (ref 30–149)
TSI SER-ACNC: 1.91 MIU/ML (ref 0.36–3.74)
URATE SERPL-MCNC: 7.7 MG/DL
VIT B12 SERPL-MCNC: 490 PG/ML (ref 193–986)
VLDLC SERPL CALC-MCNC: 26 MG/DL (ref 0–30)
WBC # BLD AUTO: 6.4 X10(3) UL (ref 4–11)

## 2022-03-01 PROCEDURE — 85025 COMPLETE CBC W/AUTO DIFF WBC: CPT

## 2022-03-01 PROCEDURE — 80061 LIPID PANEL: CPT

## 2022-03-01 PROCEDURE — 36415 COLL VENOUS BLD VENIPUNCTURE: CPT

## 2022-03-01 PROCEDURE — 82607 VITAMIN B-12: CPT

## 2022-03-01 PROCEDURE — 80053 COMPREHEN METABOLIC PANEL: CPT

## 2022-03-01 PROCEDURE — 84443 ASSAY THYROID STIM HORMONE: CPT

## 2022-03-01 PROCEDURE — 84550 ASSAY OF BLOOD/URIC ACID: CPT

## 2022-03-07 ENCOUNTER — OFFICE VISIT (OUTPATIENT)
Dept: INTERNAL MEDICINE CLINIC | Facility: CLINIC | Age: 57
End: 2022-03-07
Payer: COMMERCIAL

## 2022-03-07 VITALS
SYSTOLIC BLOOD PRESSURE: 90 MMHG | HEART RATE: 86 BPM | BODY MASS INDEX: 33.27 KG/M2 | TEMPERATURE: 98 F | DIASTOLIC BLOOD PRESSURE: 60 MMHG | HEIGHT: 71.5 IN | RESPIRATION RATE: 18 BRPM | WEIGHT: 243 LBS

## 2022-03-07 DIAGNOSIS — M10.9 GOUT, UNSPECIFIED CAUSE, UNSPECIFIED CHRONICITY, UNSPECIFIED SITE: ICD-10-CM

## 2022-03-07 DIAGNOSIS — M16.12 OSTEOARTHRITIS OF LEFT HIP, UNSPECIFIED OSTEOARTHRITIS TYPE: ICD-10-CM

## 2022-03-07 DIAGNOSIS — I10 ESSENTIAL HYPERTENSION: ICD-10-CM

## 2022-03-07 DIAGNOSIS — E78.5 HYPERLIPIDEMIA, UNSPECIFIED HYPERLIPIDEMIA TYPE: ICD-10-CM

## 2022-03-07 DIAGNOSIS — Z00.00 ROUTINE PHYSICAL EXAMINATION: Primary | ICD-10-CM

## 2022-03-07 PROCEDURE — 3074F SYST BP LT 130 MM HG: CPT | Performed by: INTERNAL MEDICINE

## 2022-03-07 PROCEDURE — 99396 PREV VISIT EST AGE 40-64: CPT | Performed by: INTERNAL MEDICINE

## 2022-03-07 PROCEDURE — 3008F BODY MASS INDEX DOCD: CPT | Performed by: INTERNAL MEDICINE

## 2022-03-07 PROCEDURE — 3078F DIAST BP <80 MM HG: CPT | Performed by: INTERNAL MEDICINE

## 2022-03-07 RX ORDER — METHYLPREDNISOLONE 4 MG/1
4 TABLET ORAL AS DIRECTED
COMMUNITY
Start: 2022-02-13

## 2022-03-20 RX ORDER — LISINOPRIL 10 MG/1
TABLET ORAL
Qty: 180 TABLET | Refills: 1 | Status: SHIPPED | OUTPATIENT
Start: 2022-03-20 | End: 2022-09-09

## 2022-03-20 NOTE — TELEPHONE ENCOUNTER
Refill passed per [address] [facility] protocol.     Requested Prescriptions   Pending Prescriptions Disp Refills    LISINOPRIL 10 MG Oral Tab [Pharmacy Med Name: Lisinopril 10 Mg Tab Solc] 60 tablet 0     Sig: TAKE TWO TABLETS (20 MG TOTAL)  BY MOUTH DAILY        Hypertensive Medications Protocol Passed - 3/20/2022 10:46 AM        Passed - CMP or BMP in past 12 months        Passed - Appointment in past 6 or next 3 months        Passed - GFR Non- > 50     Lab Results   Component Value Date    GFRNAA 62 03/01/2022                       Recent Outpatient Visits              1 week ago Routine physical examination    [address] [place], [address], Dagoberto Guido MD    Office Visit    3 months ago Skin cancer screening    [facility] Jesus Van MD    Office Visit    8 months ago Actinic keratosis    [facility] Jesus Van MD    Office Visit    11 months ago Actinic keratosis    [facility] Jesus Van MD    Office Visit    1 year ago Neoplasm of uncertain behavior of skin    [facility] Jesus Van MD    Office Visit            Future Appointments         Provider Department Appt Notes    In 2 months Sylvia Rosario MD [facility] Dermatology full body Dr Destiney Gallagher pt

## 2022-03-24 NOTE — TELEPHONE ENCOUNTER
Hypertensive Medications: Refilled per protocol    Protocol Criteria:  · Appointment scheduled in the past 6 months or in the next 3 months  · BMP or CMP in the past 12 months  · Creatinine result < 2  Recent Visits       Provider Department Primary Dx Ox[place], Q

## 2022-04-14 ENCOUNTER — OFFICE VISIT (OUTPATIENT)
Dept: OPHTHALMOLOGY | Facility: CLINIC | Age: 57
End: 2022-04-14
Payer: COMMERCIAL

## 2022-04-14 DIAGNOSIS — Z98.890 HISTORY OF STRABISMUS SURGERY: ICD-10-CM

## 2022-04-14 DIAGNOSIS — H25.13 AGE-RELATED NUCLEAR CATARACT OF BOTH EYES: ICD-10-CM

## 2022-04-14 DIAGNOSIS — H53.001 AMBLYOPIA, RIGHT: Primary | ICD-10-CM

## 2022-04-14 DIAGNOSIS — H50.10 EXOTROPIA OF RIGHT EYE: ICD-10-CM

## 2022-04-14 DIAGNOSIS — H50.21 HYPERTROPIA OF RIGHT EYE: ICD-10-CM

## 2022-04-14 PROBLEM — H50.111 EXOTROPIA OF RIGHT EYE: Status: ACTIVE | Noted: 2022-04-14

## 2022-04-14 PROCEDURE — 92004 COMPRE OPH EXAM NEW PT 1/>: CPT | Performed by: OPHTHALMOLOGY

## 2022-04-14 NOTE — PATIENT INSTRUCTIONS
Hypertropia of right eye  Longstanding; no treatment. Exotropia of right eye  Longstanding; no treatment. Amblyopia, right  Longstanding; no treatment. History of strabismus surgery- muscle surgery OD x 3   History of strabismus surgery 3 times. Age-related nuclear cataract of both eyes  Discussed mild cataracts in both eyes that are not affecting vision and are not surgical at this time.

## 2022-05-28 RX ORDER — ATORVASTATIN CALCIUM 40 MG/1
TABLET, FILM COATED ORAL
Qty: 90 TABLET | Refills: 1 | Status: SHIPPED | OUTPATIENT
Start: 2022-05-28

## 2022-05-28 NOTE — TELEPHONE ENCOUNTER
Please review; protocol failed/no protocol.      Requested Prescriptions   Pending Prescriptions Disp Refills    ATORVASTATIN 40 MG Oral Tab [Pharmacy Med Name: Atorvastatin Calcium 40 Mg Tab Nort] 90 tablet 0     Sig: TAKE ONE TABLET BY MOUTH ONE TIME DAILY        Cholesterol Medication Protocol Failed - 5/27/2022  8:57 PM        Failed - Last LDL < 130     Lab Results   Component Value Date     (H) 03/01/2022               Passed - ALT in past 12 months        Passed - LDL in past 12 months        Passed - Last ALT < 80       Lab Results   Component Value Date    ALT 33 03/01/2022             Passed - Appointment in past 12 or next 3 months               Recent Outpatient Visits              1 month ago Amblyopia, right    [facility] Estell Koyanagi, MD    Office Visit    2 months ago Routine physical examination    [facility], [facility], [address] [place] [address] Rd,[address], Chayo Mccray MD    Office Visit    5 months ago Skin cancer screening    [facility] Dermatology Donnie Nissen, MD    Office Visit    11 months ago Actinic keratosis    [facility] Donnie Nissen, MD    Office Visit    1 year ago Actinic keratosis    [facility] Donnie Nissen, MD    Office Visit             Future Appointments         Provider Department Appt Notes    In 3 weeks Cira Mason MD [facility] Dermatology full body Dr Bita Stauffer pt    In 1 month [facility] RN RADIOLOGY 1 CALCIUM SCORE; [address] [place] [address] [facility] [employer] [address] [place] [address] for [facility] Heart Scan

## 2022-06-22 ENCOUNTER — PATIENT MESSAGE (OUTPATIENT)
Dept: DERMATOLOGY CLINIC | Facility: CLINIC | Age: 57
End: 2022-06-22

## 2022-06-22 NOTE — TELEPHONE ENCOUNTER
S/w pt. appt made for 8/15/22 for full body exam per pt's request. Pt refused to be added in sooner to check lesion on arm. States it does not hurt, he's had it for a while and is not changing. \"I think it's just something that needs to be frozen off\". Pt encouraged to CB if lesion changes.  Pt voiced understanding Rhombic Flap Text: The defect edges were debeveled with a #15 scalpel blade.  Given the location of the defect and the proximity to free margins a rhombic flap was deemed most appropriate.  Using a sterile surgical marker, an appropriate rhombic flap was drawn incorporating the defect.    The area thus outlined was incised deep to adipose tissue with a #15 scalpel blade.  The skin margins were undermined to an appropriate distance in all directions utilizing iris scissors.

## 2022-06-22 NOTE — TELEPHONE ENCOUNTER
Description noted. LMTCB - we have a cancellation 6/23/22, we can add him in to check lesion on arm.

## 2022-06-22 NOTE — TELEPHONE ENCOUNTER
From: Nereyda Chadwick  To: Delaney Delgadillo MD  Sent: 6/22/2022 4:04 PM CDT  Subject: Need to schedule an appoinment with Dr. Schneider Shadow spot on my arm might be skin cancer.

## 2022-06-30 ENCOUNTER — HOSPITAL ENCOUNTER (OUTPATIENT)
Age: 57
Discharge: HOME | End: 2022-06-30
Payer: COMMERCIAL

## 2022-06-30 VITALS
HEIGHT: 73 IN | RESPIRATION RATE: 18 BRPM | TEMPERATURE: 98 F | OXYGEN SATURATION: 98 % | SYSTOLIC BLOOD PRESSURE: 118 MMHG | BODY MASS INDEX: 32.47 KG/M2 | WEIGHT: 245 LBS | DIASTOLIC BLOOD PRESSURE: 79 MMHG | HEART RATE: 72 BPM

## 2022-06-30 DIAGNOSIS — U07.1 COVID-19: Primary | ICD-10-CM

## 2022-06-30 LAB — SARS-COV-2 RNA RESP QL NAA+PROBE: DETECTED

## 2022-06-30 PROCEDURE — 99213 OFFICE O/P EST LOW 20 MIN: CPT | Performed by: NURSE PRACTITIONER

## 2022-06-30 PROCEDURE — U0002 COVID-19 LAB TEST NON-CDC: HCPCS | Performed by: NURSE PRACTITIONER

## 2022-06-30 NOTE — ED INITIAL ASSESSMENT (HPI)
Pt complaining of sore throat on Monday. Now pt has cough and sinus problems. Wife tested positive today.

## 2022-08-15 ENCOUNTER — OFFICE VISIT (OUTPATIENT)
Dept: DERMATOLOGY CLINIC | Facility: CLINIC | Age: 57
End: 2022-08-15
Payer: COMMERCIAL

## 2022-08-15 DIAGNOSIS — L81.4 SOLAR LENTIGO: ICD-10-CM

## 2022-08-15 DIAGNOSIS — L82.0 INFLAMED SEBORRHEIC KERATOSIS: ICD-10-CM

## 2022-08-15 DIAGNOSIS — L82.1 SK (SEBORRHEIC KERATOSIS): ICD-10-CM

## 2022-08-15 DIAGNOSIS — D22.9 MULTIPLE BENIGN NEVI: ICD-10-CM

## 2022-08-15 DIAGNOSIS — Z85.828 HISTORY OF SCC (SQUAMOUS CELL CARCINOMA) OF SKIN: ICD-10-CM

## 2022-08-15 DIAGNOSIS — Z12.83 SKIN CANCER SCREENING: ICD-10-CM

## 2022-08-15 DIAGNOSIS — L57.0 ACTINIC KERATOSIS: Primary | ICD-10-CM

## 2022-08-15 DIAGNOSIS — D23.9 BENIGN NEOPLASM OF SKIN, UNSPECIFIED LOCATION: ICD-10-CM

## 2022-08-15 PROCEDURE — 99213 OFFICE O/P EST LOW 20 MIN: CPT | Performed by: DERMATOLOGY

## 2022-09-09 RX ORDER — LISINOPRIL 10 MG/1
TABLET ORAL
Qty: 180 TABLET | Refills: 1 | Status: SHIPPED | OUTPATIENT
Start: 2022-09-09

## 2022-09-09 NOTE — TELEPHONE ENCOUNTER
Protocol failed or has No Protocol, please review  Requested Prescriptions   Pending Prescriptions Disp Refills    LISINOPRIL 10 MG Oral Tab [Pharmacy Med Name: Lisinopril 10 Mg Tab Solc] 180 tablet 0     Sig: TAKE TWO TABLETS (20 MG TOTAL)  BY MOUTH DAILY        Hypertensive Medications Protocol Failed - 9/8/2022  1:31 AM        Failed - CMP or BMP in past 6 months     No results found for this or any previous visit (from the past 4392 hour(s)).               Failed - In person appointment or virtual visit in the past 6 months       Recent Outpatient Visits              3 weeks ago Actinic keratosis    [facility] Dermatology Mechele Goltz, MD    Office Visit    4 months ago Amblyopia, right    [facility] Esthela Clark MD    Office Visit    6 months ago Routine physical examination    [facility], [address] [place] [address] Rd,[address], Lindy Harrison MD    Office Visit    8 months ago Skin cancer screening    [address] [place] [address] Dermatology Shanel Mojica MD    Office Visit    1 year ago Actinic keratosis    [address] [place] [address] Dermatology Shanel Mojica MD    Office Visit     Future Appointments         Provider Department Appt Notes    In 5 months [facility] 6 mo f/u  full body check    In 6 months Rachel Vallejo MD [facility], [address] [place] [address] [address] - In person appointment in the past 12 or next 3 months       Recent Outpatient Visits              3 weeks ago Actinic keratosis    [facility] Mechele Goltz, MD    Office Visit    4 months ago Amblyopia, right    [facility] Esthela Clark MD    Office Visit    6 months ago Routine physical examination    Frank Davison MD    Office Visit    8 months ago Skin cancer screening    [address] [place] [address] Dermatology Miguelina Reynolds MD    Office Visit    1 year ago Actinic keratosis    [facility] Miguelina Reynolds MD    Office Visit     Future Appointments         Provider Department Appt Notes    In 5 months Mat Langford MD [facility] D[facility] 6 mo f/u  full body check    In 6 months Luly Arenas MD [place] [employer] [facility], [address], [place] Bloodwork               Passed - Last BP reading less than 140/90     BP Readings from Last 1 Encounters:  06/30/22 : 118/79                Passed - GFR > 50     No results found for: [facility]                  Future Appointments         Provider Department Appt Notes    In 5 months Mat Langford MD [facility] Derm[facility] 6 mo f/u  full body check    In 6 months Luly Arenas MD [place] [employer] [facility], [address] [employer]          Recent Outpatient Visits              3 weeks ago Actinic keratosis    [facility] Mat Langford MD    Office Visit    4 months ago Amblyopia, right    [facility] Brenden Eller MD    Office Visit    6 months ago Routine physical examination    [place] [employer] [facility], [address], Oskar Mccray MD    Office Visit    8 months ago Skin cancer screening    [facility] Miguelina Reynolds MD    Office Visit    1 year ago Actinic keratosis    [facility] Miguelina Reynolds MD    Office Visit

## 2022-10-04 ENCOUNTER — PATIENT MESSAGE (OUTPATIENT)
Facility: CLINIC | Age: 57
End: 2022-10-04

## 2022-10-05 NOTE — TELEPHONE ENCOUNTER
From: Manish Riley  To: Pascual Daniels MD  Sent: 10/4/2022 4:13 PM CDT  Subject: LIFE INSURANCE    I am getting more life insurance and Elian Joyce is going to be reaching out to you for my medical records. Thank you.

## 2022-11-28 ENCOUNTER — TELEPHONE (OUTPATIENT)
Dept: INTERNAL MEDICINE CLINIC | Facility: CLINIC | Age: 57
End: 2022-11-28

## 2022-11-29 NOTE — TELEPHONE ENCOUNTER
Patient contacted and informed that Dr Wesley Larose is not at the [address] [facility] office today but will address form in the AM when he is here.  Patient voiced understanding and states will  form in AM.

## 2022-11-29 NOTE — TELEPHONE ENCOUNTER
Patient's spouse is calling to request forms be completed today as they are due tomorrow 11/30/22. Patient's spouse is requesting a call when forms are ready for . Please advise.

## 2022-11-30 NOTE — TELEPHONE ENCOUNTER
Left message for patient, informing that form available for  at [address] [facility] office .

## 2022-12-02 RX ORDER — ATORVASTATIN CALCIUM 40 MG/1
TABLET, FILM COATED ORAL
Qty: 90 TABLET | Refills: 1 | Status: SHIPPED | OUTPATIENT
Start: 2022-12-02

## 2023-02-15 ENCOUNTER — OFFICE VISIT (OUTPATIENT)
Dept: DERMATOLOGY CLINIC | Facility: CLINIC | Age: 58
End: 2023-02-15

## 2023-02-15 DIAGNOSIS — L81.4 SOLAR LENTIGO: ICD-10-CM

## 2023-02-15 DIAGNOSIS — D18.01 HEMANGIOMA OF SKIN AND SUBCUTANEOUS TISSUE: ICD-10-CM

## 2023-02-15 DIAGNOSIS — L57.0 ACTINIC KERATOSIS: Primary | ICD-10-CM

## 2023-02-15 DIAGNOSIS — L82.1 SEBORRHEIC KERATOSES: ICD-10-CM

## 2023-02-15 DIAGNOSIS — D23.9 BENIGN NEOPLASM OF SKIN, UNSPECIFIED LOCATION: ICD-10-CM

## 2023-02-15 DIAGNOSIS — D22.9 MULTIPLE BENIGN NEVI: ICD-10-CM

## 2023-02-15 DIAGNOSIS — Z85.828 HISTORY OF SCC (SQUAMOUS CELL CARCINOMA) OF SKIN: ICD-10-CM

## 2023-02-15 DIAGNOSIS — L82.0 INFLAMED SEBORRHEIC KERATOSIS: ICD-10-CM

## 2023-02-15 PROCEDURE — 17000 DESTRUCT PREMALG LESION: CPT | Performed by: DERMATOLOGY

## 2023-02-15 PROCEDURE — 99213 OFFICE O/P EST LOW 20 MIN: CPT | Performed by: DERMATOLOGY

## 2023-02-15 PROCEDURE — 17003 DESTRUCT PREMALG LES 2-14: CPT | Performed by: DERMATOLOGY

## 2023-03-10 ENCOUNTER — PATIENT MESSAGE (OUTPATIENT)
Facility: CLINIC | Age: 58
End: 2023-03-10

## 2023-03-10 DIAGNOSIS — M10.9 GOUT, UNSPECIFIED CAUSE, UNSPECIFIED CHRONICITY, UNSPECIFIED SITE: ICD-10-CM

## 2023-03-10 DIAGNOSIS — Z00.00 ROUTINE PHYSICAL EXAMINATION: Primary | ICD-10-CM

## 2023-03-10 NOTE — TELEPHONE ENCOUNTER
From: Hazel Ponce  To: Hans Gibbs MD  Sent: 3/10/2023 12:58 PM CST  Subject: Blood Test    I would like to have my blood test next week before my yearly physical. Can you please put the paperwork in so I can go next Wednesday. Thank you.

## 2023-03-15 ENCOUNTER — LAB ENCOUNTER (OUTPATIENT)
Dept: LAB | Facility: HOSPITAL | Age: 58
End: 2023-03-15
Attending: INTERNAL MEDICINE
Payer: COMMERCIAL

## 2023-03-15 DIAGNOSIS — M10.9 GOUT, UNSPECIFIED CAUSE, UNSPECIFIED CHRONICITY, UNSPECIFIED SITE: ICD-10-CM

## 2023-03-15 DIAGNOSIS — Z00.00 ROUTINE PHYSICAL EXAMINATION: ICD-10-CM

## 2023-03-15 LAB
ALBUMIN SERPL-MCNC: 3.8 G/DL (ref 3.4–5)
ALBUMIN/GLOB SERPL: 1 {RATIO} (ref 1–2)
ALP LIVER SERPL-CCNC: 75 U/L
ALT SERPL-CCNC: 25 U/L
ANION GAP SERPL CALC-SCNC: 7 MMOL/L (ref 0–18)
AST SERPL-CCNC: 19 U/L (ref 15–37)
BASOPHILS # BLD AUTO: 0.05 X10(3) UL (ref 0–0.2)
BASOPHILS NFR BLD AUTO: 0.8 %
BILIRUB SERPL-MCNC: 1 MG/DL (ref 0.1–2)
BUN BLD-MCNC: 17 MG/DL (ref 7–18)
BUN/CREAT SERPL: 13.8 (ref 10–20)
CALCIUM BLD-MCNC: 9.3 MG/DL (ref 8.5–10.1)
CHLORIDE SERPL-SCNC: 104 MMOL/L (ref 98–112)
CHOLEST SERPL-MCNC: 171 MG/DL (ref ?–200)
CO2 SERPL-SCNC: 27 MMOL/L (ref 21–32)
COMPLEXED PSA SERPL-MCNC: 0.7 NG/ML (ref ?–4)
CREAT BLD-MCNC: 1.23 MG/DL
DEPRECATED RDW RBC AUTO: 43.8 FL (ref 35.1–46.3)
EOSINOPHIL # BLD AUTO: 0.22 X10(3) UL (ref 0–0.7)
EOSINOPHIL NFR BLD AUTO: 3.6 %
ERYTHROCYTE [DISTWIDTH] IN BLOOD BY AUTOMATED COUNT: 12.7 % (ref 11–15)
FASTING PATIENT LIPID ANSWER: YES
FASTING STATUS PATIENT QL REPORTED: YES
GFR SERPLBLD BASED ON 1.73 SQ M-ARVRAT: 68 ML/MIN/1.73M2 (ref 60–?)
GLOBULIN PLAS-MCNC: 3.7 G/DL (ref 2.8–4.4)
GLUCOSE BLD-MCNC: 95 MG/DL (ref 70–99)
HCT VFR BLD AUTO: 41.9 %
HDLC SERPL-MCNC: 49 MG/DL (ref 40–59)
HGB BLD-MCNC: 14.3 G/DL
IMM GRANULOCYTES # BLD AUTO: 0.01 X10(3) UL (ref 0–1)
IMM GRANULOCYTES NFR BLD: 0.2 %
LDLC SERPL CALC-MCNC: 98 MG/DL (ref ?–100)
LYMPHOCYTES # BLD AUTO: 2.31 X10(3) UL (ref 1–4)
LYMPHOCYTES NFR BLD AUTO: 37.3 %
MCH RBC QN AUTO: 31.9 PG (ref 26–34)
MCHC RBC AUTO-ENTMCNC: 34.1 G/DL (ref 31–37)
MCV RBC AUTO: 93.5 FL
MONOCYTES # BLD AUTO: 0.52 X10(3) UL (ref 0.1–1)
MONOCYTES NFR BLD AUTO: 8.4 %
NEUTROPHILS # BLD AUTO: 3.08 X10 (3) UL (ref 1.5–7.7)
NEUTROPHILS # BLD AUTO: 3.08 X10(3) UL (ref 1.5–7.7)
NEUTROPHILS NFR BLD AUTO: 49.7 %
NONHDLC SERPL-MCNC: 122 MG/DL (ref ?–130)
OSMOLALITY SERPL CALC.SUM OF ELEC: 287 MOSM/KG (ref 275–295)
PLATELET # BLD AUTO: 257 10(3)UL (ref 150–450)
POTASSIUM SERPL-SCNC: 4 MMOL/L (ref 3.5–5.1)
PROT SERPL-MCNC: 7.5 G/DL (ref 6.4–8.2)
RBC # BLD AUTO: 4.48 X10(6)UL
SODIUM SERPL-SCNC: 138 MMOL/L (ref 136–145)
TRIGL SERPL-MCNC: 137 MG/DL (ref 30–149)
TSI SER-ACNC: 2.3 MIU/ML (ref 0.36–3.74)
URATE SERPL-MCNC: 7.1 MG/DL
VLDLC SERPL CALC-MCNC: 23 MG/DL (ref 0–30)
WBC # BLD AUTO: 6.2 X10(3) UL (ref 4–11)

## 2023-03-15 PROCEDURE — 85025 COMPLETE CBC W/AUTO DIFF WBC: CPT

## 2023-03-15 PROCEDURE — 84550 ASSAY OF BLOOD/URIC ACID: CPT

## 2023-03-15 PROCEDURE — 84443 ASSAY THYROID STIM HORMONE: CPT

## 2023-03-15 PROCEDURE — 80061 LIPID PANEL: CPT

## 2023-03-15 PROCEDURE — 36415 COLL VENOUS BLD VENIPUNCTURE: CPT

## 2023-03-15 PROCEDURE — 80053 COMPREHEN METABOLIC PANEL: CPT

## 2023-03-20 ENCOUNTER — OFFICE VISIT (OUTPATIENT)
Facility: CLINIC | Age: 58
End: 2023-03-20
Payer: COMMERCIAL

## 2023-03-20 VITALS
HEIGHT: 72 IN | RESPIRATION RATE: 20 BRPM | WEIGHT: 242 LBS | TEMPERATURE: 98 F | DIASTOLIC BLOOD PRESSURE: 60 MMHG | SYSTOLIC BLOOD PRESSURE: 114 MMHG | BODY MASS INDEX: 32.78 KG/M2 | HEART RATE: 76 BPM

## 2023-03-20 DIAGNOSIS — Z23 NEED FOR VACCINATION: ICD-10-CM

## 2023-03-20 DIAGNOSIS — I10 ESSENTIAL HYPERTENSION: ICD-10-CM

## 2023-03-20 DIAGNOSIS — E78.5 HYPERLIPIDEMIA, UNSPECIFIED HYPERLIPIDEMIA TYPE: ICD-10-CM

## 2023-03-20 DIAGNOSIS — M10.9 GOUT, UNSPECIFIED CAUSE, UNSPECIFIED CHRONICITY, UNSPECIFIED SITE: ICD-10-CM

## 2023-03-20 DIAGNOSIS — Z00.00 ROUTINE PHYSICAL EXAMINATION: Primary | ICD-10-CM

## 2023-03-20 PROCEDURE — 3074F SYST BP LT 130 MM HG: CPT | Performed by: INTERNAL MEDICINE

## 2023-03-20 PROCEDURE — 99396 PREV VISIT EST AGE 40-64: CPT | Performed by: INTERNAL MEDICINE

## 2023-03-20 PROCEDURE — 90471 IMMUNIZATION ADMIN: CPT | Performed by: INTERNAL MEDICINE

## 2023-03-20 PROCEDURE — 3008F BODY MASS INDEX DOCD: CPT | Performed by: INTERNAL MEDICINE

## 2023-03-20 PROCEDURE — 3078F DIAST BP <80 MM HG: CPT | Performed by: INTERNAL MEDICINE

## 2023-03-20 PROCEDURE — 90715 TDAP VACCINE 7 YRS/> IM: CPT | Performed by: INTERNAL MEDICINE

## 2023-04-17 RX ORDER — LISINOPRIL 10 MG/1
TABLET ORAL
Qty: 180 TABLET | Refills: 3 | Status: SHIPPED | OUTPATIENT
Start: 2023-04-17

## 2023-04-18 NOTE — TELEPHONE ENCOUNTER
Refill passed per [facility] protocol.     .  Requested Prescriptions   Pending Prescriptions Disp Refills    LISINOPRIL 10 MG Oral Tab [Pharmacy Med Name: Lisinopril 10 Mg Tab Solc] 180 tablet 0     Sig: TAKE 2 TABLETS (20MG TOTAL) BY MOUTH DAILY       Hypertensive Medications Protocol Passed - 4/16/2023  2:31 PM        Passed - In person appointment in the past 12 or next 3 months     Recent Outpatient Visits              4 weeks ago Routine physical examination    Mahendra Hernandez, Kiesha Solis MD    Office Visit    2 months ago Actinic keratosis    [place], Gary Hannon [place]    Office Visit    8 months ago Actinic keratosis    [place], [place], Gary Hannon MD    Office Visit    1 year ago Amblyopia, right    Kayla Masoud Valdes, Nayely Cordero MD    Office Visit    1 year ago Routine physical examination    [address] [facility],[address], [address] [facility], Kiesha Mccray MD    Office Visit          Future Appointments         Provider Department Appt Notes    In 4 months Kemi Morse MD [facility], [address] [facility]                Passed - Last BP reading less than 140/90     BP Readings from Last 1 Encounters:  03/20/23 : 114/60                Passed - CMP or BMP in past 6 months     Recent Results (from the past 4392 hour(s))   COMP METABOLIC PANEL (14)    Collection Time: 03/15/23  8:07 AM   Result Value Ref Range    Glucose 95 70 - 99 mg/dL    Sodium 138 136 - 145 mmol/L    Potassium 4.0 3.5 - 5.1 mmol/L    Chloride 104 98 - 112 mmol/L    CO2 27.0 21.0 - 32.0 mmol/L    Anion Gap 7 0 - 18 mmol/L    BUN 17 7 - 18 mg/dL    Creatinine 1.23 0.70 - 1.30 mg/dL    BUN/CREA Ratio 13.8 10.0 - 20.0    Calcium, Total 9.3 8.5 - 10.1 mg/dL    Calculated Osmolality 287 275 - 295 mOsm/kg    eGFR-Cr 68 >=60 mL/min/1.73m2    ALT 25 16 - 61 U/L    AST 19 15 - 37 U/L    Alkaline Phosphatase 75 45 - 117 U/L    Bilirubin, Total 1.0 0.1 - 2.0 mg/dL    Total Protein 7.5 6.4 - 8.2 g/dL    Albumin 3.8 3.4 - 5.0 g/dL    Globulin  3.7 2.8 - 4.4 g/dL    A/G Ratio 1.0 1.0 - 2.0    Patient Fasting for CMP? Yes      *Note: Due to a large number of results and/or encounters for the requested time period, some results have not been displayed. A complete set of results can be found in Results Review.                  Passed - In person appointment or virtual visit in the past 6 months     Recent Outpatient Visits              4 weeks ago Routine physical examination    Alanna Michel MD    Office Visit    2 months ago Actinic keratosis    Duke Asim, Ethel Eduardo MD    Office Visit    8 months ago Actinic keratosis    Duke Fu, Ethel Eduardo MD    Office Visit    1 year ago Amblyopia, right    [address] [place] [address], Stewart Anderson MD    Office Visit    1 year ago Routine physical examination    [address] [place] [address], Mere Arriaga MD    Office Visit          Future Appointments         Provider Department Appt Notes    In 4 months Mariza Eduardo MD [address], [facility] 25 or [facility] > 50     GFR Evaluation  EGFRCR: 68 , resulted on 3/15/2023                 Recent Outpatient Visits              4 weeks ago Routine physical examination    Mingo Hernández MD    Office Visit    2 months ago Actinic keratosis    Duke Fu, Ethel Eduardo MD    Office Visit    8 months ago Actinic keratosis    [address], [facility] Kemi Morse MD    Office Visit    1 year ago Amblyopia, right    Masoud Liriano, Nayely Cordero MD    Office Visit    1 year ago Routine physical examination    Mahendra Liriano, Kiesha Solis MD    Office Visit            Future Appointments         Provider Department Appt Notes    In 4 months Kemi Morse MD [address], [facility]

## 2023-08-16 ENCOUNTER — OFFICE VISIT (OUTPATIENT)
Dept: DERMATOLOGY CLINIC | Facility: CLINIC | Age: 58
End: 2023-08-16

## 2023-08-16 DIAGNOSIS — D18.01 HEMANGIOMA OF SKIN AND SUBCUTANEOUS TISSUE: ICD-10-CM

## 2023-08-16 DIAGNOSIS — D22.9 MULTIPLE BENIGN NEVI: ICD-10-CM

## 2023-08-16 DIAGNOSIS — L57.0 ACTINIC KERATOSIS: Primary | ICD-10-CM

## 2023-08-16 DIAGNOSIS — L82.1 SEBORRHEIC KERATOSES: ICD-10-CM

## 2023-08-16 DIAGNOSIS — Z85.828 HISTORY OF SCC (SQUAMOUS CELL CARCINOMA) OF SKIN: ICD-10-CM

## 2023-08-16 DIAGNOSIS — D23.9 BENIGN NEOPLASM OF SKIN, UNSPECIFIED LOCATION: ICD-10-CM

## 2023-08-16 DIAGNOSIS — L81.4 SOLAR LENTIGO: ICD-10-CM

## 2023-08-16 DIAGNOSIS — L57.8 SUN-DAMAGED SKIN: ICD-10-CM

## 2023-08-16 PROCEDURE — 17003 DESTRUCT PREMALG LES 2-14: CPT | Performed by: DERMATOLOGY

## 2023-08-16 PROCEDURE — 99213 OFFICE O/P EST LOW 20 MIN: CPT | Performed by: DERMATOLOGY

## 2023-08-16 PROCEDURE — 17000 DESTRUCT PREMALG LESION: CPT | Performed by: DERMATOLOGY

## 2023-08-27 NOTE — PROGRESS NOTES
Edelmira Tapia is a 59year old male. HPI:     CC:  Patient presents with:  Full Skin Exam: LOV 2/15/23. Pt has hx of Ak's and SCC. Pt present with full body exam, pt denies any spots of concern. Allergies:  Patient has no known allergies. HISTORY:    Past Medical History:   Diagnosis Date    Actinic keratosis 2021    hypertrophic AK - left forehead    Age-related nuclear cataract of both eyes 4/14/2022    Amblyopia, right 4/14/2022    Biceps muscle tear 2011    torn llt.  bicep - ice hockey injury; surgical repair    Cataract     monitor    Exotropia of right eye 4/14/2022    Gout     High blood pressure     High cholesterol     History of strabismus surgery 4/14/2022    Hypertropia of right eye 4/14/2022    Lipid screening 2/20/2014    Metatarsal bone fracture     left 4th metatarsal break - walking boot, 2007    Primary squamous cell carcinoma of skin of right lower extremity 03/02/2020    Wide excision of Right leg SCC / flap reconstruction     Skin cancer 02/2020    Squamous cell carcinoma - left forearm    Squamous cell carcinoma, leg 02/18/2020    Right lower medial leg    Strabismus 2005, 2006    bilateral eye surgery x2      Past Surgical History:   Procedure Laterality Date    APPENDECTOMY  2010    [address] [facility] [address] TENODESIS BICEPS Bilateral     COLONOSCOPY N/A 7/24/2020    Procedure: COLONOSCOPY;  Surgeon: Angelina Elliott MD;  Location: [address] [place] [address] ENDOSCOPY    EYE SURGERY Bilateral 2005, 2006    x2    SHOULDER SURG PROC UNLISTED Right     rotator cuff repair    SKIN TISSUE REARRANGEMENT Right 05/15/2020    Wide excision of Right leg SCC / flap reconstruction     STRABISMUS SURGERY Bilateral 2005,2006    strabismus sx x 3-1966- unknown doctor, 2005, 2006 both Dr. Doc Ellis      Family History   Problem Relation Age of Onset    Lipids Father         hyperlipidemia    Hypertension Father     Lipids Mother         hyperlipidemia    Hypertension Mother     Macular degeneration Mother     Cataracts Sister Cataracts Brother     Diabetes Neg     Glaucoma Neg       Social History     Socioeconomic History    Marital status:    Tobacco Use    Smoking status: Never    Smokeless tobacco: Former     Quit date: 1/3/1994   Vaping Use    Vaping Use: Never used   Substance and Sexual Activity    Alcohol use: Yes     Alcohol/week: 0.0 standard drinks of alcohol     Comment: 2 beers - weekly    Drug use: No    Sexual activity: Yes     Partners: Female   Other Topics Concern    Caffeine Concern Yes     Comment: soda    Reaction to local anesthetic No    Right Handed Yes    Pt has a pacemaker No    Pt has a defibrillator No        Current Outpatient Medications   Medication Sig Dispense Refill    atorvastatin 40 MG Oral Tab TAKE ONE TABLET BY MOUTH ONE TIME DAILY 90 tablet 3    lisinopril 10 MG Oral Tab TAKE 2 TABLETS (20MG TOTAL) BY MOUTH DAILY 180 tablet 3     Allergies:   No Known Allergies    Past Medical History:   Diagnosis Date    Actinic keratosis 2021    hypertrophic AK - left forehead    Age-related nuclear cataract of both eyes 4/14/2022    Amblyopia, right 4/14/2022    Biceps muscle tear 2011    torn llt.  bicep - ice hockey injury; surgical repair    Cataract     monitor    Exotropia of right eye 4/14/2022    Gout     High blood pressure     High cholesterol     History of strabismus surgery 4/14/2022    Hypertropia of right eye 4/14/2022    Lipid screening 2/20/2014    Metatarsal bone fracture     left 4th metatarsal break - walking boot, 2007    Primary squamous cell carcinoma of skin of right lower extremity 03/02/2020    Wide excision of Right leg SCC / flap reconstruction     Skin cancer 02/2020    Squamous cell carcinoma - left forearm    Squamous cell carcinoma, leg 02/18/2020    Right lower medial leg    Strabismus 2005, 2006    bilateral eye surgery x2     Past Surgical History:   Procedure Laterality Date    APPENDECTOMY  2010    [address] [facility] [address],[place] [address] TENODESIS BICEPS Bilateral     COLONOSCOPY N/A 7/24/2020 Procedure: COLONOSCOPY;  Surgeon: Mary Mirza MD;  Location: [address] [place] [address] ENDOSCOPY    EYE SURGERY Bilateral 2005, 2006    x2    SHOULDER SURG PROC UNLISTED Right     rotator cuff repair    SKIN TISSUE REARRANGEMENT Right 05/15/2020    Wide excision of Right leg SCC / flap reconstruction     STRABISMUS SURGERY Bilateral 2005,2006    strabismus sx x 3-1966- unknown doctor, 2005, 2006 both Dr. Stella Mendenhall History      Marital status:       Spouse name: Not on file      Number of children: Not on file      Years of education: Not on file      Highest education level: Not on file    Occupational History      Not on file    Tobacco Use      Smoking status: Never      Smokeless tobacco: Former        Quit date: 1/3/1994    Vaping Use      Vaping Use: Never used    Substance and Sexual Activity      Alcohol use:  Yes        Alcohol/week: 0.0 standard drinks of alcohol        Comment: 2 beers - weekly      Drug use: No      Sexual activity: Yes        Partners: Female    Other Topics      Concerns:         Service: Not Asked        Blood Transfusions: Not Asked        Caffeine Concern: Yes          soda        Occupational Exposure: Not Asked        Hobby Hazards: Not Asked        Sleep Concern: Not Asked        Stress Concern: Not Asked        Weight Concern: Not Asked        Special Diet: Not Asked        Back Care: Not Asked        Exercise: Not Asked        Bike Helmet: Not Asked        Seat Belt: Not Asked        Self-Exams: Not Asked        Grew up on a farm: Not Asked        History of tanning: Not Asked        Outdoor occupation: Not Asked        Reaction to local anesthetic: No        Left Handed: Not Asked        Right Handed: Yes        Currently spends a great deal of time in the sun: Not Asked        Past Sunlamp Treatments for Acne: Not Asked        Hx of Spending [place] of Time in [place]: Not Asked        Bad sunburns in the past: Not Asked        Tanning Salons in the Past: Not Asked        Hx of Radiation Treatments: Not Asked        Regular use of sun block: Not Asked        Pt has a pacemaker: No        Pt has a defibrillator: No    Social History Narrative      Not on file    Social Determinants of Health  Financial Resource Strain: Not on file  Food Insecurity: Not on file  Transportation Needs: Not on file  Physical Activity: Not on file  Stress: Not on file  Social Connections: Not on file  Housing Stability: Not on file  Family History   Problem Relation Age of Onset    Lipids Father         hyperlipidemia    Hypertension Father     Lipids Mother         hyperlipidemia    Hypertension Mother     Macular degeneration Mother     Cataracts Sister     Cataracts Brother     Diabetes Neg     Glaucoma Neg        There were no vitals filed for this visit. HPI:    Patient presents with:  Full Skin Exam: LOV 2/15/23. Pt has hx of Ak's and SCC. Pt present with full body exam, pt denies any spots of concern. Follow-up history SCC, multiple skin cancers in the past left arm right lower leg. History of AK's    Past notes/ records and appropriate/relevant lab results including pathology and past body maps reviewed. Including outside notes/ PCP notes as appropriate. Updated and new information noted in current visit. Patient presents with concerns above. Patient has been in their usual state of health. History, medications, allergies reviewed as noted. ROS:  new relevant systemic complaints as noted       Physical Examination:     Well-developed well-nourished patient alert oriented in no acute distress. Exam total-body performed, including scalp, head, neck, face,nails, hair, external eyes, including conjunctival mucosa, eyelids, lips external ears, back, chest,/ breasts, axillae,  abdomen, arms, legs, palms.      Multiple light to medium brown, well marginated, uniformly pigmented, macules and papules 6 mm and less scattered on exam. pigmented lesions examined with dermoscopy benign-appearing patterns. Waxy tannish keratotic papules scattered, cherry-red vascular papules scattered. See map today's date for lesions noted . Otherwise remarkable for lesions as noted on map. See details of examination  See Assessment /Plan for additional history and physical exam also:    Assessment / plan:    No orders of the defined types were placed in this encounter. Meds & Refills for this Visit:  Requested Prescriptions      No prescriptions requested or ordered in this encounter         Actinic keratosis  (primary encounter diagnosis)  Solar lentigo  Multiple benign nevi  Seborrheic keratoses  Benign neoplasm of skin, unspecified location  Hemangioma of skin and subcutaneous tissue  History of scc (squamous cell carcinoma) of skin  Sun-damaged skin    See details on map. Remarkable for:    Erythematous scaling keratotic papules noted at sites noted on map  Actinic Keratoses. Precancerous nature discussed. Sun protection, sunscreen/ blocks encouraged Lesions treated with cryo- . Biopsy if not resolved. Temples right ear arms X9    Concerned with whitish spots over the dorsal hand more waxy tan keratotic Aquatensen SK multiple SKs over the forehead reassurance. More diffuse AK's over the neck temples consider topicals. Dermatofibroma right leg reassurance. Sun damage dyschromia extensive erythematous scaling patches over the forearms. No more keratotic lesions at this time. History SCC no recurrence. Patient with history of skin cancer. No evidence of recurrence. No new skin cancer. Legs, left arm no recurrence SCC      More extensive benign keratoses waxy tan papules back arms legs waxy tan keratotic papules lesions in areas of concern as noted reassurance given. Benign nature discussed. Possibility of cryo, alphahydroxy acids over-the-counter retinol's discussed.     No other susupicious lesions on todays  exam.    Please refer to map for specific lesions. See additional diagnoses. Pros cons of various therapies, risks benefits discussed. Pathophysiology discussed with patient. Therapeutic options reviewed. See  Medications in grid. Instructions reviewed at length. Benign nevi, seborrheic  keratoses, cherry angiomas:  Reassurance regarding other benign skin lesions. Signs and symptoms of skin cancer, ABCDE's of melanoma discussed with patient. Sunscreen use, sun protection, self exams reviewed. Followup as noted RTC routine checkup 6 mos - one year or p.r.n. Encounter Times Including precharting, reviewing chart, prior notes obtaining history: 10 minutes, medical exam :10 minutes, notes on body map, plan, counseling 10minutes My total time spent caring for the patient on the day of the encounter: 30 minutes     The patient indicates understanding of these issues and agrees to the plan. The patient is asked to return as noted in follow-up/ above. This note was generated using Dragon voice recognition software. Please contact me regarding any confusion resulting from errors in recognition.

## 2024-02-16 ENCOUNTER — TELEPHONE (OUTPATIENT)
Dept: DERMATOLOGY CLINIC | Facility: CLINIC | Age: 59
End: 2024-02-16

## 2024-02-16 NOTE — TELEPHONE ENCOUNTER
Called patient via telephone because he was called for his appointment by clinical staff in clinic. When he picked up the phone he stated he was going to let me have it since I was on the phone with him although he knew it was not my fault. He stated it was absolutely not acceptable to wait over 45 mins on a Friday evening and he was going to light me up. I apologized and offered the pride line where he could give his feedback, however he refused and hung up the phone.

## 2024-02-20 ENCOUNTER — PATIENT MESSAGE (OUTPATIENT)
Dept: DERMATOLOGY CLINIC | Facility: CLINIC | Age: 59
End: 2024-02-20

## 2024-02-20 NOTE — TELEPHONE ENCOUNTER
Forwarded to Barbara will forward to Dr. Saavedra also as I did already address issues surrounding this with him as well.

## 2024-02-20 NOTE — TELEPHONE ENCOUNTER
Spoke with patient by phone, very pleasant, apologized for delay on Friday 2/16, patient understanding and asked for reschedule appt, assisted. Patient rescheduled for 3/15/24 @ 4pm with KMT.

## 2024-02-20 NOTE — TELEPHONE ENCOUNTER
Spoke with patient by phone, very pleasant, provided apology for delay Friday and voided no show fee as patient was not a no show 2/16. Patient requested appt reschedule, pt appointment made for 3/15/2024 @ 4pm with Dr. Rothman.

## 2024-03-15 ENCOUNTER — OFFICE VISIT (OUTPATIENT)
Dept: DERMATOLOGY CLINIC | Facility: CLINIC | Age: 59
End: 2024-03-15
Payer: COMMERCIAL

## 2024-03-15 DIAGNOSIS — L81.4 SOLAR LENTIGO: ICD-10-CM

## 2024-03-15 DIAGNOSIS — Z85.828 HISTORY OF SCC (SQUAMOUS CELL CARCINOMA) OF SKIN: ICD-10-CM

## 2024-03-15 DIAGNOSIS — D22.9 MULTIPLE BENIGN NEVI: ICD-10-CM

## 2024-03-15 DIAGNOSIS — L57.0 ACTINIC KERATOSIS: Primary | ICD-10-CM

## 2024-03-15 DIAGNOSIS — D23.9 BENIGN NEOPLASM OF SKIN, UNSPECIFIED LOCATION: ICD-10-CM

## 2024-03-15 DIAGNOSIS — D18.01 HEMANGIOMA OF SKIN AND SUBCUTANEOUS TISSUE: ICD-10-CM

## 2024-03-15 DIAGNOSIS — L82.1 SEBORRHEIC KERATOSES: ICD-10-CM

## 2024-03-15 PROCEDURE — 99213 OFFICE O/P EST LOW 20 MIN: CPT | Performed by: DERMATOLOGY

## 2024-03-15 PROCEDURE — 17000 DESTRUCT PREMALG LESION: CPT | Performed by: DERMATOLOGY

## 2024-03-15 PROCEDURE — 17003 DESTRUCT PREMALG LES 2-14: CPT | Performed by: DERMATOLOGY

## 2024-03-19 ENCOUNTER — LAB ENCOUNTER (OUTPATIENT)
Dept: LAB | Facility: HOSPITAL | Age: 59
End: 2024-03-19
Attending: INTERNAL MEDICINE
Payer: COMMERCIAL

## 2024-03-19 DIAGNOSIS — M10.9 GOUT, UNSPECIFIED CAUSE, UNSPECIFIED CHRONICITY, UNSPECIFIED SITE: ICD-10-CM

## 2024-03-19 DIAGNOSIS — Z00.00 ROUTINE PHYSICAL EXAMINATION: ICD-10-CM

## 2024-03-19 LAB
COMPLEXED PSA SERPL-MCNC: 0.41 NG/ML (ref ?–4)
URATE SERPL-MCNC: 5.7 MG/DL

## 2024-03-19 PROCEDURE — 84443 ASSAY THYROID STIM HORMONE: CPT | Performed by: INTERNAL MEDICINE

## 2024-03-19 PROCEDURE — 84550 ASSAY OF BLOOD/URIC ACID: CPT

## 2024-03-19 PROCEDURE — 36415 COLL VENOUS BLD VENIPUNCTURE: CPT | Performed by: INTERNAL MEDICINE

## 2024-03-19 PROCEDURE — 85025 COMPLETE CBC W/AUTO DIFF WBC: CPT | Performed by: INTERNAL MEDICINE

## 2024-03-19 PROCEDURE — 80061 LIPID PANEL: CPT | Performed by: INTERNAL MEDICINE

## 2024-03-19 PROCEDURE — 80053 COMPREHEN METABOLIC PANEL: CPT | Performed by: INTERNAL MEDICINE

## 2024-03-22 ENCOUNTER — OFFICE VISIT (OUTPATIENT)
Dept: INTERNAL MEDICINE CLINIC | Facility: CLINIC | Age: 59
End: 2024-03-22
Payer: COMMERCIAL

## 2024-03-22 VITALS
BODY MASS INDEX: 34.13 KG/M2 | DIASTOLIC BLOOD PRESSURE: 80 MMHG | WEIGHT: 252 LBS | HEART RATE: 91 BPM | HEIGHT: 72 IN | SYSTOLIC BLOOD PRESSURE: 110 MMHG | RESPIRATION RATE: 16 BRPM

## 2024-03-22 DIAGNOSIS — E78.5 HYPERLIPIDEMIA, UNSPECIFIED HYPERLIPIDEMIA TYPE: ICD-10-CM

## 2024-03-22 DIAGNOSIS — Z00.00 ROUTINE PHYSICAL EXAMINATION: Primary | ICD-10-CM

## 2024-03-22 DIAGNOSIS — I10 ESSENTIAL HYPERTENSION: ICD-10-CM

## 2024-03-22 DIAGNOSIS — M25.552 LEFT HIP PAIN: ICD-10-CM

## 2024-03-22 DIAGNOSIS — M10.9 GOUT, UNSPECIFIED CAUSE, UNSPECIFIED CHRONICITY, UNSPECIFIED SITE: ICD-10-CM

## 2024-03-22 PROCEDURE — 3074F SYST BP LT 130 MM HG: CPT | Performed by: INTERNAL MEDICINE

## 2024-03-22 PROCEDURE — 99396 PREV VISIT EST AGE 40-64: CPT | Performed by: INTERNAL MEDICINE

## 2024-03-22 PROCEDURE — 3079F DIAST BP 80-89 MM HG: CPT | Performed by: INTERNAL MEDICINE

## 2024-03-22 PROCEDURE — 3008F BODY MASS INDEX DOCD: CPT | Performed by: INTERNAL MEDICINE

## 2024-03-22 RX ORDER — ATORVASTATIN CALCIUM 20 MG/1
20 TABLET, FILM COATED ORAL NIGHTLY
Qty: 90 TABLET | Refills: 1 | Status: SHIPPED | OUTPATIENT
Start: 2024-03-22

## 2024-03-22 NOTE — PROGRESS NOTES
HPI:    Patient ID: Cyril Bauer is a 58 year old male.    HPI  Patient is here requesting physical exam and follow-up on chronic medical issues as listed below.  Last seen here 1 year ago.  That time weight was 242 pounds.  That was down previously from 275 pounds.  No changes were made at the last visit.  Since that time he has seen dermatology.  Today his weight is up to 252 pounds.  Current medications reviewed.  Health maintenance vaccination status reviewed.    Patient had full blood work done 3 days ago.  Normal CBC, CMP, lipid profile, TSH, PSA, uric acid level.  HDL is borderline low at 33 which is lower than what it was before. Other lipids were better.     Pt feels that the weight gain is mostly muscle gain. He is doing more weight lifting in th elast few months. His waist is the same. No major issues except for left hip pain. Started 2 weeks ago. Sometimes goes to buttock. It wakes him up at 1:30 AM with pain. Rx with Alleve with relief. No precipitating factor. Xrays at the [facility] showed DJD of the hip a few years ago. No back pain or radiation to the leg. Pain can be front or back of the hip. He exercises almost daily with weights and cardio. Not checking BP. Has had some gout flares in the knees over the last year. No tobacco or EtOH.       Patient Active Problem List   Diagnosis    Gout    Essential hypertension    Hyperlipidemia    Obesity    Neoplasm of uncertain behavior of skin    SCC (squamous cell carcinoma), leg, right    Granuloma annulare    Sun-damaged skin    Actinic keratosis    Xerotic eczema    History of SCC (squamous cell carcinoma) of skin    Rosacea    Inflamed seborrheic keratosis    History of actinic keratoses    History of strabismus surgery- muscle surgery OD x 3     Amblyopia, right    Hypertropia of right eye    Exotropia of right eye    Age-related nuclear cataract of both eyes          HISTORY:  Past Medical History:   Diagnosis Date    Actinic keratosis 2021     hypertrophic AK - left forehead    Age-related nuclear cataract of both eyes 04/14/2022    Amblyopia, right 04/14/2022    Biceps muscle tear 2011    torn llt. bicep - ice hockey injury; surgical repair    Cancer (HCC)     Cataract     monitor    Essential hypertension     Exotropia of right eye 04/14/2022    Gout     High blood pressure     High cholesterol     History of strabismus surgery 04/14/2022    Hyperlipidemia     Hypertropia of right eye 04/14/2022    Lipid screening 02/20/2014    Metatarsal bone fracture     left 4th metatarsal break - walking boot, 2007    Primary squamous cell carcinoma of skin of right lower extremity 03/02/2020    Wide excision of Right leg SCC / flap reconstruction     Skin cancer 02/2020    Squamous cell carcinoma - left forearm    Squamous cell carcinoma, leg 02/18/2020    Right lower medial leg    Strabismus 2005, 2006    bilateral eye surgery x2      Past Surgical History:   Procedure Laterality Date    APPENDECTOMY  2010    ARTHROSC SH TENODESIS BICEPS Bilateral     COLONOSCOPY N/A 07/24/2020    Procedure: COLONOSCOPY;  Surgeon: Marty Kelsey MD;  Location: [facility] ENDOSCOPY    EYE SURGERY Bilateral 2005, 2006    x2    HERNIA SURGERY  2010    OTHER SURGICAL HISTORY  2 eye surgeries    SHOULDER SURG PROC UNLISTED Right     rotator cuff repair    SKIN SURGERY      SKIN TISSUE REARRANGEMENT Right 05/15/2020    Wide excision of Right leg SCC / flap reconstruction     STRABISMUS SURGERY Bilateral 2005,2006    strabismus sx x 3-1966- unknown doctor, 2005, 2006 both Dr. Bauer      Family History   Problem Relation Age of Onset    Lipids Father         hyperlipidemia    Hypertension Father     Lipids Mother         hyperlipidemia    Hypertension Mother     Macular degeneration Mother     Cataracts Sister     Cataracts Brother     Diabetes Neg     Glaucoma Neg       Social History     Socioeconomic History    Marital status:    Tobacco Use    Smoking status: Never    Smokeless  tobacco: Former     Quit date: 1/3/1994    Tobacco comments:     Chewed tobacco for 8 years. Quit in 1994,   Vaping Use    Vaping Use: Never used   Substance and Sexual Activity    Alcohol use: Not Currently     Alcohol/week: 4.0 standard drinks of alcohol     Types: 2 Cans of beer, 2 Shots of liquor per week     Comment: 2 times a week    Drug use: No    Sexual activity: Yes     Partners: Female   Other Topics Concern    Caffeine Concern Yes     Comment: soda    Grew up on a farm No    History of tanning No    Outdoor occupation No    Reaction to local anesthetic No    Right Handed Yes    Pt has a pacemaker No    Pt has a defibrillator No          Review of Systems          Current Outpatient Medications   Medication Sig Dispense Refill    atorvastatin 40 MG Oral Tab TAKE ONE TABLET BY MOUTH ONE TIME DAILY 90 tablet 3    lisinopril 10 MG Oral Tab TAKE 2 TABLETS (20MG TOTAL) BY MOUTH DAILY 180 tablet 3     Allergies:No Known Allergies     PHYSICAL EXAM:   /87   Pulse 91   Resp 16   Ht 6' (1.829 m)   Wt 252 lb (114.3 kg)   BMI 34.18 kg/m²      Physical Exam  Constitutional:       Appearance: Normal appearance. He is well-developed.   HENT:      Right Ear: Tympanic membrane and ear canal normal.      Left Ear: Tympanic membrane and ear canal normal.      Nose: Nose normal.      Mouth/Throat:      Pharynx: No oropharyngeal exudate or posterior oropharyngeal erythema.   Eyes:      Conjunctiva/sclera: Conjunctivae normal.      Pupils: Pupils are equal, round, and reactive to light.   Neck:      Thyroid: No thyromegaly.      Vascular: No carotid bruit.   Cardiovascular:      Rate and Rhythm: Normal rate and regular rhythm.      Pulses: Normal pulses.      Heart sounds: Normal heart sounds. No murmur heard.  Pulmonary:      Effort: Pulmonary effort is normal.      Breath sounds: Normal breath sounds. No wheezing or rales.   Abdominal:      General: Bowel sounds are normal.      Palpations: Abdomen is soft. There  is no mass.      Tenderness: There is no abdominal tenderness.      Hernia: There is no hernia in the left inguinal area.   Genitourinary:     Penis: Normal and circumcised.       Testes: Normal.   Musculoskeletal:      Right lower leg: No edema.      Left lower leg: No edema.   Lymphadenopathy:      Cervical: No cervical adenopathy.   Skin:     General: Skin is warm and dry.      Findings: No rash.   Neurological:      General: No focal deficit present.      Mental Status: He is alert.      Cranial Nerves: No cranial nerve deficit.      Coordination: Coordination normal.   Psychiatric:         Mood and Affect: Mood normal.         Behavior: Behavior normal.         Thought Content: Thought content normal.         Judgment: Judgment normal.          Wt Readings from Last 6 Encounters:   03/22/24 252 lb (114.3 kg)   03/20/23 242 lb (109.8 kg)   06/30/22 245 lb (111.1 kg)   03/07/22 243 lb (110.2 kg)   11/06/21 245 lb (111.1 kg)   01/20/21 275 lb 11.2 oz (125.1 kg)             ASSESSMENT/PLAN:   1. Routine physical examination  Physical exam is unremarkable.  Active issues as below.  Health maintenance issues reviewed.  Patient declines a shingles shot at this time.  Encouraged healthy diet and regular exercise.  Maintain healthy body weight prior to try to lose a few pounds.  Follow-up in 6 months.    2. Essential hypertension  Well-controlled.  Continue lisinopril 20 mg a day.    3. Hyperlipidemia, unspecified hyperlipidemia type  Numbers are much better this time around.  Patient is interested in decreasing dose of atorvastatin.  We will cut down from 40 down to 20 mg a day.  Follow-up in 6 months with repeat blood work at that time.    4. Gout, unspecified cause, unspecified chronicity, unspecified site  Recent uric acid level was normal.  No need for any new treatment.    5. Left hip pain  Increasing left hip pain with history of arthritis.  Check x-ray and refer to orthopedics.  It sounds like it is coming from  his hip and not his back.  - XR HIP W OR WO PELVIS 2 OR 3 VIEWS, LEFT (CPT=73502); Future  - Ortho Referral - In [facility]         Meds This Visit:  Requested Prescriptions      No prescriptions requested or ordered in this encounter       Imaging & Referrals:  None         Jaren Godoy MD

## 2024-03-25 NOTE — PROGRESS NOTES
Cyril Bauer is a 59 year old male.  HPI:     CC:    Chief Complaint   Patient presents with    Full Skin Exam     LOV 08/23. Pt has hx of Ak's and SCC. Pt present for full body exam. Pt denies nay areas of concern.          Allergies:  Patient has no known allergies.    HISTORY:    Past Medical History:   Diagnosis Date    Actinic keratosis 2021    hypertrophic AK - left forehead    Age-related nuclear cataract of both eyes 04/14/2022    Amblyopia, right 04/14/2022    Biceps muscle tear 2011    torn llt. bicep - ice hockey injury; surgical repair    Cancer (HCC)     Cataract     monitor    Essential hypertension     Exotropia of right eye 04/14/2022    Gout     High blood pressure     High cholesterol     History of strabismus surgery 04/14/2022    Hyperlipidemia     Hypertropia of right eye 04/14/2022    Lipid screening 02/20/2014    Metatarsal bone fracture     left 4th metatarsal break - walking boot, 2007    Primary squamous cell carcinoma of skin of right lower extremity 03/02/2020    Wide excision of Right leg SCC / flap reconstruction     Skin cancer 02/2020    Squamous cell carcinoma - left forearm    Squamous cell carcinoma, leg 02/18/2020    Right lower medial leg    Strabismus 2005, 2006    bilateral eye surgery x2      Past Surgical History:   Procedure Laterality Date    APPENDECTOMY  2010    ARTHROSC SH TENODESIS BICEPS Bilateral     COLONOSCOPY N/A 07/24/2020    Procedure: COLONOSCOPY;  Surgeon: Marty Kelsey MD;  Location: [facility] ENDOSCOPY    EYE SURGERY Bilateral 2005, 2006    x2    HERNIA SURGERY  2010    OTHER SURGICAL HISTORY  2 eye surgeries    SHOULDER SURG PROC UNLISTED Right     rotator cuff repair    SKIN SURGERY      SKIN TISSUE REARRANGEMENT Right 05/15/2020    Wide excision of Right leg SCC / flap reconstruction     STRABISMUS SURGERY Bilateral 2005,2006    strabismus sx x 3-1966- unknown doctor, 2005, 2006 both Dr. Bauer      Family History   Problem Relation Age of Onset     Lipids Father         hyperlipidemia    Hypertension Father     Lipids Mother         hyperlipidemia    Hypertension Mother     Macular degeneration Mother     Cataracts Sister     Cataracts Brother     Diabetes Neg     Glaucoma Neg       Social History     Socioeconomic History    Marital status:    Tobacco Use    Smoking status: Never    Smokeless tobacco: Former     Quit date: 1/3/1994    Tobacco comments:     Chewed tobacco for 8 years. Quit in 1994,   Vaping Use    Vaping Use: Never used   Substance and Sexual Activity    Alcohol use: Not Currently     Alcohol/week: 4.0 standard drinks of alcohol     Types: 2 Cans of beer, 2 Shots of liquor per week     Comment: 2 times a week    Drug use: No    Sexual activity: Yes     Partners: Female   Other Topics Concern    Caffeine Concern Yes     Comment: soda    Grew up on a farm No    History of tanning No    Outdoor occupation No    Reaction to local anesthetic No    Right Handed Yes    Pt has a pacemaker No    Pt has a defibrillator No        Current Outpatient Medications   Medication Sig Dispense Refill    lisinopril 10 MG Oral Tab TAKE 2 TABLETS (20MG TOTAL) BY MOUTH DAILY 180 tablet 3    atorvastatin 20 MG Oral Tab Take 1 tablet (20 mg total) by mouth nightly. TAKE ONE TABLET BY MOUTH ONE TIME DAILY 90 tablet 1     Allergies:   No Known Allergies    Past Medical History:   Diagnosis Date    Actinic keratosis 2021    hypertrophic AK - left forehead    Age-related nuclear cataract of both eyes 04/14/2022    Amblyopia, right 04/14/2022    Biceps muscle tear 2011    torn llt. bicep - ice hockey injury; surgical repair    Cancer (HCC)     Cataract     monitor    Essential hypertension     Exotropia of right eye 04/14/2022    Gout     High blood pressure     High cholesterol     History of strabismus surgery 04/14/2022    Hyperlipidemia     Hypertropia of right eye 04/14/2022    Lipid screening 02/20/2014    Metatarsal bone fracture     left 4th metatarsal break  - walking boot, 2007    Primary squamous cell carcinoma of skin of right lower extremity 03/02/2020    Wide excision of Right leg SCC / flap reconstruction     Skin cancer 02/2020    Squamous cell carcinoma - left forearm    Squamous cell carcinoma, leg 02/18/2020    Right lower medial leg    Strabismus 2005, 2006    bilateral eye surgery x2     Past Surgical History:   Procedure Laterality Date    APPENDECTOMY  2010    ARTHROSC SH TENODESIS BICEPS Bilateral     COLONOSCOPY N/A 07/24/2020    Procedure: COLONOSCOPY;  Surgeon: Marty Kelsey MD;  Location: [facility] ENDOSCOPY    EYE SURGERY Bilateral 2005, 2006    x2    HERNIA SURGERY  2010    OTHER SURGICAL HISTORY  2 eye surgeries    SHOULDER SURG PROC UNLISTED Right     rotator cuff repair    SKIN SURGERY      SKIN TISSUE REARRANGEMENT Right 05/15/2020    Wide excision of Right leg SCC / flap reconstruction     STRABISMUS SURGERY Bilateral 2005,2006    strabismus sx x 3-1966- unknown doctor, 2005, 2006 both Dr. Bauer     Social History     Socioeconomic History    Marital status:      Spouse name: Not on file    Number of children: Not on file    Years of education: Not on file    Highest education level: Not on file   Occupational History    Not on file   Tobacco Use    Smoking status: Never    Smokeless tobacco: Former     Quit date: 1/3/1994    Tobacco comments:     Chewed tobacco for 8 years. Quit in 1994,   Vaping Use    Vaping Use: Never used   Substance and Sexual Activity    Alcohol use: Not Currently     Alcohol/week: 4.0 standard drinks of alcohol     Types: 2 Cans of beer, 2 Shots of liquor per week     Comment: 2 times a week    Drug use: No    Sexual activity: Yes     Partners: Female   Other Topics Concern     Service Not Asked    Blood Transfusions Not Asked    Caffeine Concern Yes     Comment: soda    Occupational Exposure Not Asked    Hobby Hazards Not Asked    Sleep Concern Not Asked    Stress Concern Not Asked    Weight Concern  Not Asked    Special Diet Not Asked    Back Care Not Asked    Exercise Not Asked    Bike Helmet Not Asked    Seat Belt Not Asked    Self-Exams Not Asked    Grew up on a farm No    History of tanning No    Outdoor occupation No    Reaction to local anesthetic No    Left Handed Not Asked    Right Handed Yes    Currently spends a great deal of time in the sun Not Asked    Past Sunlamp Treatments for Acne Not Asked    Hx of Spending Great Deal of Time in Sun Not Asked    Bad sunburns in the past Not Asked    Tanning Salons in the Past Not Asked    Hx of Radiation Treatments Not Asked    Regular use of sun block Not Asked    Pt has a pacemaker No    Pt has a defibrillator No   Social History Narrative    Not on file     Social Determinants of Health     Financial Resource Strain: Not on file   Food Insecurity: Not on file   Transportation Needs: Not on file   Physical Activity: Not on file   Stress: Not on file   Social Connections: Not on file   Housing Stability: Not on file     Family History   Problem Relation Age of Onset    Lipids Father         hyperlipidemia    Hypertension Father     Lipids Mother         hyperlipidemia    Hypertension Mother     Macular degeneration Mother     Cataracts Sister     Cataracts Brother     Diabetes Neg     Glaucoma Neg        There were no vitals filed for this visit.    HPI:    Chief Complaint   Patient presents with    Full Skin Exam     LOV 08/23. Pt has hx of Ak's and SCC. Pt present for full body exam. Pt denies nay areas of concern.      Follow-up history SCC, multiple skin cancers in the past left arm right lower leg.  History of AK's    Past notes/ records and appropriate/relevant lab results including pathology and past body maps reviewed. Including outside notes/ PCP notes as appropriate. Updated and new information noted in current visit.     Patient presents with concerns above.    Patient has been in their usual state of health.      History, medications, allergies  reviewed as noted.      ROS:  new relevant systemic complaints as noted       Physical Examination:     Well-developed well-nourished patient alert oriented in no acute distress.  Exam total-body performed, including scalp, head, neck, face,nails, hair, external eyes, including conjunctival mucosa, eyelids, lips external ears, back, chest,/ breasts, axillae,  abdomen, arms, legs, palms.     Multiple light to medium brown, well marginated, uniformly pigmented, macules and papules 6 mm and less scattered on exam. pigmented lesions examined with dermoscopy benign-appearing patterns.     Waxy tannish keratotic papules scattered, cherry-red vascular papules scattered.    See map today's date for lesions noted .      Otherwise remarkable for lesions as noted on map.  See details of examination  See Assessment /Plan for additional history and physical exam also:    Assessment / plan:    No orders of the defined types were placed in this encounter.      Meds & Refills for this Visit:  Requested Prescriptions      No prescriptions requested or ordered in this encounter         Encounter Diagnoses   Name Primary?    Actinic keratosis Yes    Solar lentigo     Multiple benign nevi     Seborrheic keratoses     Benign neoplasm of skin, unspecified location     Hemangioma of skin and subcutaneous tissue     History of SCC (squamous cell carcinoma) of skin        See details on map.      Remarkable for:    Erythematous scaling keratotic papules noted at sites noted on map  Actinic Keratoses.  Precancerous nature discussed. Sun protection, sunscreen/ blocks encouraged Lesions treated with cryo- .  Biopsy if not resolved.    Left lateral forehead temple right templex4    Background of SKs fairly prominent over the forehead cheeks monitor    Concerned with whitish spots over the dorsal hand more waxy tan keratotic Aquatensen SK multiple SKs over the forehead reassurance.  More diffuse AK's over the neck temples consider  topicals.    Dermatofibroma right leg reassurance.    Sun damage dyschromia extensive erythematous scaling patches over the forearms.  No more keratotic lesions at this time.  History SCC no recurrence.      Patient with history of skin cancer.  No evidence of recurrence.    No new skin cancer.  Legs, left arm no recurrence SCC      More extensive benign keratoses waxy tan papules back arms legs waxy tan keratotic papules lesions in areas of concern as noted reassurance given.  Benign nature discussed.  Possibility of cryo, alphahydroxy acids over-the-counter retinol's discussed.    No other susupicious lesions on todays  exam.    Please refer to map for specific lesions.  See additional diagnoses.  Pros cons of various therapies, risks benefits discussed.Pathophysiology discussed with patient.  Therapeutic options reviewed.  See  Medications in grid.  Instructions reviewed at length.    Benign nevi, seborrheic  keratoses, cherry angiomas:  Reassurance regarding other benign skin lesions.Signs and symptoms of skin cancer, ABCDE's of melanoma discussed with patient. Sunscreen use, sun protection, self exams reviewed.  Followup as noted RTC routine checkup 6 mos - one year or p.r.n.    Encounter Times Including precharting, reviewing chart, prior notes obtaining history: 10 minutes, medical exam :10 minutes, notes on body map, plan, counseling 10minutes My total time spent caring for the patient on the day of the encounter: 30 minutes     The patient indicates understanding of these issues and agrees to the plan.  The patient is asked to return as noted in follow-up/ above.    This note was generated using Dragon voice recognition software.  Please contact me regarding any confusion resulting from errors in recognition.

## 2024-04-01 ENCOUNTER — HOSPITAL ENCOUNTER (OUTPATIENT)
Dept: GENERAL RADIOLOGY | Age: 59
Discharge: HOME | End: 2024-04-01
Attending: INTERNAL MEDICINE
Payer: COMMERCIAL

## 2024-04-01 DIAGNOSIS — M25.552 LEFT HIP PAIN: ICD-10-CM

## 2024-04-01 PROCEDURE — 73502 X-RAY EXAM HIP UNI 2-3 VIEWS: CPT | Performed by: INTERNAL MEDICINE

## 2024-04-26 ENCOUNTER — TELEPHONE (OUTPATIENT)
Dept: ORTHOPEDICS CLINIC | Facility: CLINIC | Age: 59
End: 2024-04-26

## 2024-04-26 NOTE — TELEPHONE ENCOUNTER
Please schedule the patient with the appropriate provider. Patient was scheduled by outside nurse. Dr. Joseph does not see patients for hip over the age of 50.     Patient is coming in on 04/19/24. Thank you!

## 2024-04-26 NOTE — TELEPHONE ENCOUNTER
Lvm for pt that his appointment was scheduled incorrectly and that he needs to be rescheduled with a provider that will see him for his left hip pain.  Appointment was cancelled.

## 2024-04-29 RX ORDER — LISINOPRIL 10 MG/1
TABLET ORAL
Qty: 180 TABLET | Refills: 3 | Status: SHIPPED | OUTPATIENT
Start: 2024-04-29

## 2024-04-29 NOTE — TELEPHONE ENCOUNTER
REFILL PASSED PER [facility] PROTOCOLS    Requested Prescriptions   Pending Prescriptions Disp Refills    LISINOPRIL 10 MG Oral Tab [Pharmacy Med Name: Lisinopril 10 Mg Tab Lupi] 180 tablet 0     Sig: TAKE 2 TABLETS (20MG TOTAL) BY MOUTH DAILY       Hypertension Medications Protocol Passed - 4/28/2024  2:31 AM        Passed - CMP or BMP in past 12 months        Passed - Last BP reading less than 140/90     BP Readings from Last 1 Encounters:   03/22/24 110/80               Passed - In person appointment or virtual visit in the past 12 mos or appointment in next 3 mos     Recent Outpatient Visits              1 month ago Routine physical examination    [facility] Jaren Godoy MD    Office Visit    1 month ago Actinic keratosis    [facility], Joslyn Goodman MD    Office Visit    8 months ago Actinic keratosis    [facility], Joslyn Goodman MD    Office Visit    1 year ago Routine physical examination    [facility] [place]    Office Visit    1 year ago Actinic keratosis    [facility] Joslyn Goodman MD    Office Visit          Future Appointments         Provider Department Appt Notes    In 5 months Jaren Godoy MD [facility] 6 mth follow up                    Passed - EGFRCR or GFRNAA > 50     GFR Evaluation  EGFRCR: 74 , resulted on 3/19/2024               Future Appointments         Provider Department Appt Notes    In 5 months [facility] God[facility] MD [facility] mth [facility] up          Recent Outpatient Visits              1 month ago Routine physical examination    [facility] Jaren Godoy MD    Office Visit    1 month ago  Actinic keratosis    [facility], [facility], Joslyn Goodman MD    Office Visit    8 months ago Actinic keratosis    [facility], [facility], Joslyn Goodman MD    Office Visit    1 year ago Routine physical examination    [facility], [facility] Jaren Godoy MD    Office Visit    1 year ago Actinic keratosis    [facility], [facility], Joslyn Goodman MD    Office Visit

## 2024-05-07 ENCOUNTER — TELEPHONE (OUTPATIENT)
Dept: INTERNAL MEDICINE CLINIC | Facility: CLINIC | Age: 59
End: 2024-05-07

## 2024-05-07 NOTE — TELEPHONE ENCOUNTER
Patient is scheduled for surgery on 5/21/2024.  Patient provided the following information on mychart .    Dr. Perez Martinez         [facility]  [facility] at [place], [place].    Hip replacement surgery    Please advise.

## 2024-05-08 ENCOUNTER — OFFICE VISIT (OUTPATIENT)
Dept: INTERNAL MEDICINE CLINIC | Facility: CLINIC | Age: 59
End: 2024-05-08
Payer: COMMERCIAL

## 2024-05-08 ENCOUNTER — LAB ENCOUNTER (OUTPATIENT)
Dept: LAB | Age: 59
End: 2024-05-08
Attending: INTERNAL MEDICINE
Payer: COMMERCIAL

## 2024-05-08 VITALS
BODY MASS INDEX: 35.6 KG/M2 | OXYGEN SATURATION: 95 % | DIASTOLIC BLOOD PRESSURE: 88 MMHG | WEIGHT: 262.81 LBS | SYSTOLIC BLOOD PRESSURE: 116 MMHG | RESPIRATION RATE: 20 BRPM | HEIGHT: 72 IN | HEART RATE: 76 BPM

## 2024-05-08 DIAGNOSIS — Z01.818 PRE-OP EXAMINATION: Primary | ICD-10-CM

## 2024-05-08 DIAGNOSIS — I10 ESSENTIAL HYPERTENSION: ICD-10-CM

## 2024-05-08 DIAGNOSIS — M16.12 OSTEOARTHRITIS OF LEFT HIP, UNSPECIFIED OSTEOARTHRITIS TYPE: ICD-10-CM

## 2024-05-08 DIAGNOSIS — M10.9 GOUT, UNSPECIFIED CAUSE, UNSPECIFIED CHRONICITY, UNSPECIFIED SITE: ICD-10-CM

## 2024-05-08 LAB
ALBUMIN SERPL-MCNC: 4.9 G/DL (ref 3.2–4.8)
ALBUMIN/GLOB SERPL: 1.5 {RATIO} (ref 1–2)
ALP LIVER SERPL-CCNC: 133 U/L
ALT SERPL-CCNC: 18 U/L
ANION GAP SERPL CALC-SCNC: 8 MMOL/L (ref 0–18)
AST SERPL-CCNC: 21 U/L (ref ?–34)
ATRIAL RATE: 65 BPM
BASOPHILS # BLD AUTO: 0.06 X10(3) UL (ref 0–0.2)
BASOPHILS NFR BLD AUTO: 0.6 %
BILIRUB SERPL-MCNC: 0.8 MG/DL (ref 0.2–1.1)
BUN BLD-MCNC: 18 MG/DL (ref 9–23)
BUN/CREAT SERPL: 15.5 (ref 10–20)
CALCIUM BLD-MCNC: 10 MG/DL (ref 8.7–10.4)
CHLORIDE SERPL-SCNC: 104 MMOL/L (ref 98–112)
CO2 SERPL-SCNC: 25 MMOL/L (ref 21–32)
CREAT BLD-MCNC: 1.16 MG/DL
DEPRECATED RDW RBC AUTO: 40.1 FL (ref 35.1–46.3)
EGFRCR SERPLBLD CKD-EPI 2021: 72 ML/MIN/1.73M2 (ref 60–?)
EOSINOPHIL # BLD AUTO: 0.54 X10(3) UL (ref 0–0.7)
EOSINOPHIL NFR BLD AUTO: 5.6 %
ERYTHROCYTE [DISTWIDTH] IN BLOOD BY AUTOMATED COUNT: 12.2 % (ref 11–15)
FASTING STATUS PATIENT QL REPORTED: YES
GLOBULIN PLAS-MCNC: 3.3 G/DL (ref 2–3.5)
GLUCOSE BLD-MCNC: 94 MG/DL (ref 70–99)
HCT VFR BLD AUTO: 42.3 %
HGB BLD-MCNC: 14.7 G/DL
IMM GRANULOCYTES # BLD AUTO: 0.04 X10(3) UL (ref 0–1)
IMM GRANULOCYTES NFR BLD: 0.4 %
LYMPHOCYTES # BLD AUTO: 2.78 X10(3) UL (ref 1–4)
LYMPHOCYTES NFR BLD AUTO: 28.8 %
MCH RBC QN AUTO: 31.5 PG (ref 26–34)
MCHC RBC AUTO-ENTMCNC: 34.8 G/DL (ref 31–37)
MCV RBC AUTO: 90.6 FL
MONOCYTES # BLD AUTO: 0.77 X10(3) UL (ref 0.1–1)
MONOCYTES NFR BLD AUTO: 8 %
NEUTROPHILS # BLD AUTO: 5.46 X10 (3) UL (ref 1.5–7.7)
NEUTROPHILS # BLD AUTO: 5.46 X10(3) UL (ref 1.5–7.7)
NEUTROPHILS NFR BLD AUTO: 56.6 %
OSMOLALITY SERPL CALC.SUM OF ELEC: 286 MOSM/KG (ref 275–295)
P AXIS: 3 DEGREES
P-R INTERVAL: 176 MS
PLATELET # BLD AUTO: 386 10(3)UL (ref 150–450)
POTASSIUM SERPL-SCNC: 4.6 MMOL/L (ref 3.5–5.1)
PROT SERPL-MCNC: 8.2 G/DL (ref 5.7–8.2)
Q-T INTERVAL: 394 MS
QRS DURATION: 92 MS
QTC CALCULATION (BEZET): 409 MS
R AXIS: -27 DEGREES
RBC # BLD AUTO: 4.67 X10(6)UL
SODIUM SERPL-SCNC: 137 MMOL/L (ref 136–145)
T AXIS: 23 DEGREES
VENTRICULAR RATE: 65 BPM
WBC # BLD AUTO: 9.7 X10(3) UL (ref 4–11)

## 2024-05-08 PROCEDURE — 36415 COLL VENOUS BLD VENIPUNCTURE: CPT

## 2024-05-08 PROCEDURE — 3074F SYST BP LT 130 MM HG: CPT | Performed by: INTERNAL MEDICINE

## 2024-05-08 PROCEDURE — 80053 COMPREHEN METABOLIC PANEL: CPT

## 2024-05-08 PROCEDURE — 85025 COMPLETE CBC W/AUTO DIFF WBC: CPT

## 2024-05-08 PROCEDURE — 93010 ELECTROCARDIOGRAM REPORT: CPT | Performed by: INTERNAL MEDICINE

## 2024-05-08 PROCEDURE — 3008F BODY MASS INDEX DOCD: CPT | Performed by: INTERNAL MEDICINE

## 2024-05-08 PROCEDURE — G2211 COMPLEX E/M VISIT ADD ON: HCPCS | Performed by: INTERNAL MEDICINE

## 2024-05-08 PROCEDURE — 99214 OFFICE O/P EST MOD 30 MIN: CPT | Performed by: INTERNAL MEDICINE

## 2024-05-08 PROCEDURE — 93005 ELECTROCARDIOGRAM TRACING: CPT

## 2024-05-08 PROCEDURE — 3079F DIAST BP 80-89 MM HG: CPT | Performed by: INTERNAL MEDICINE

## 2024-05-08 RX ORDER — TRAMADOL HYDROCHLORIDE 50 MG/1
TABLET ORAL
COMMUNITY

## 2024-05-08 NOTE — TELEPHONE ENCOUNTER
Once I get the results of the EKG back, I will complete the preoperative clearance.  He was seen today.

## 2024-05-08 NOTE — PROGRESS NOTES
HPI:    Patient ID: Cyril Bauer is a 59 year old male.    HPI    Patient is here requesting preoperative evaluation prior to undergoing left total hip replacement on May 21 under the guidance of Dr. Perez Martinez.  I last saw him on March 22 for general physical exam.  His left hip is really been bothering him badly since March 9.  Before that it was intermittent pain.  Now it is much more constant and more severe.  He tried to see a few different orthopedic doctors and has ended up with Dr. Perez Martinez as his best option.  Other than the hip, he has been doing reasonably well.  Did have a recent flare of gout in his right knee and toe.  That has resolved.  He is hip greatly limits his ability to exercise.  He cannot bend over and pick things up.  Recently has been able to mow the lawn.  He was last in the gym about a month ago.  There he would lift weights and use the elliptical machine.  With these physical activities, he did not have any chest pain or shortness of breath.  He has never had any urinary tract infections.  Denies any current dysuria.  Current medications reviewed.    Patient Active Problem List   Diagnosis    Gout    Essential hypertension    Hyperlipidemia    Obesity    Neoplasm of uncertain behavior of skin    SCC (squamous cell carcinoma), leg, right    Granuloma annulare    Sun-damaged skin    Actinic keratosis    Xerotic eczema    History of SCC (squamous cell carcinoma) of skin    Rosacea    Inflamed seborrheic keratosis    History of actinic keratoses    History of strabismus surgery- muscle surgery OD x 3     Amblyopia, right    Hypertropia of right eye    Exotropia of right eye    Age-related nuclear cataract of both eyes          HISTORY:  Past Medical History:    Actinic keratosis    hypertrophic AK - left forehead    Age-related nuclear cataract of both eyes    Amblyopia, right    Arthritis    Left hip    Biceps muscle tear    torn llt. bicep - ice hockey injury; surgical repair     Cancer (HCC)    Cataract    monitor    Essential hypertension    Exotropia of right eye    Gout    High blood pressure    High cholesterol    History of strabismus surgery    Hyperlipidemia    Hypertropia of right eye    Lipid screening    Metatarsal bone fracture    left 4th metatarsal break - walking boot, 2007    Primary squamous cell carcinoma of skin of right lower extremity    Wide excision of Right leg SCC / flap reconstruction     Skin cancer    Squamous cell carcinoma - left forearm    Squamous cell carcinoma, leg    Right lower medial leg    Strabismus    bilateral eye surgery x2      Past Surgical History:   Procedure Laterality Date    Appendectomy  2010    Arthrosc sh tenodesis biceps Bilateral     Colonoscopy N/A 07/24/2020    Procedure: COLONOSCOPY;  Surgeon: Marty Kelsey MD;  Location: [facility] ENDOSCOPY    Eye surgery Bilateral 2005, 2006    x2    Hernia surgery  2010    Other surgical history  2 eye surgeries    Shoulder surg proc unlisted Right     rotator cuff repair    Skin surgery      Skin tissue rearrangement Right 05/15/2020    Wide excision of Right leg SCC / flap reconstruction     Strabismus surgery Bilateral 2005,2006    strabismus sx x 3-1966- unknown doctor, 2005, 2006 both Dr. Bauer      Family History   Problem Relation Age of Onset    Lipids Father         hyperlipidemia    Hypertension Father     Lipids Mother         hyperlipidemia    Hypertension Mother     Macular degeneration Mother     Cataracts Sister     Cataracts Brother     Diabetes Neg     Glaucoma Neg       Social History     Socioeconomic History    Marital status:    Tobacco Use    Smoking status: Never    Smokeless tobacco: Former     Quit date: 1/3/1994    Tobacco comments:     Chewed tobacco for 8 years. Quit in 1994,   Vaping Use    Vaping status: Never Used   Substance and Sexual Activity    Alcohol use: Not Currently     Alcohol/week: 2.0 standard drinks of alcohol     Types: 2 Shots of liquor per week      Comment: 1 times a week    Drug use: No    Sexual activity: Yes     Partners: Female   Other Topics Concern    Caffeine Concern Yes     Comment: soda    Grew up on a farm No    History of tanning No    Outdoor occupation No    Reaction to local anesthetic No    Right Handed Yes    Pt has a pacemaker No    Pt has a defibrillator No          Review of Systems   Constitutional:  Negative for chills and fever.   HENT:  Negative for hearing loss.    Eyes:  Negative for visual disturbance.   Respiratory:  Negative for cough and shortness of breath.    Cardiovascular:  Negative for chest pain and palpitations.   Gastrointestinal:  Negative for abdominal pain, constipation, diarrhea, nausea and vomiting.   Genitourinary:  Negative for dysuria and hematuria.   Skin:  Negative for rash.   Neurological:  Positive for weakness (in legs from pain). Negative for numbness and headaches.   Psychiatric/Behavioral:  Negative for dysphoric mood. The patient is not nervous/anxious.              Current Outpatient Medications   Medication Sig Dispense Refill    lisinopril 10 MG Oral Tab TAKE 2 TABLETS (20MG TOTAL) BY MOUTH DAILY 180 tablet 3    atorvastatin 20 MG Oral Tab Take 1 tablet (20 mg total) by mouth nightly. TAKE ONE TABLET BY MOUTH ONE TIME DAILY 90 tablet 1    traMADol 50 MG Oral Tab  (Patient not taking: Reported on 5/8/2024)       Allergies:No Known Allergies     PHYSICAL EXAM:   /88 (BP Location: Left arm, Patient Position: Sitting, Cuff Size: large)   Pulse 76   Resp 20   Ht 6' (1.829 m)   Wt 262 lb 12.8 oz (119.2 kg)   SpO2 95%   BMI 35.64 kg/m²      Physical Exam  Constitutional:       Appearance: Normal appearance. He is well-developed. He is obese.   HENT:      Nose: Nose normal.      Mouth/Throat:      Pharynx: No oropharyngeal exudate or posterior oropharyngeal erythema.   Eyes:      Conjunctiva/sclera: Conjunctivae normal.   Neck:      Vascular: No carotid bruit.   Cardiovascular:      Rate and  Rhythm: Normal rate and regular rhythm.      Pulses: Normal pulses.      Heart sounds: Normal heart sounds. No murmur heard.  Pulmonary:      Effort: Pulmonary effort is normal.      Breath sounds: Normal breath sounds. No wheezing or rales.   Abdominal:      General: Bowel sounds are normal.      Palpations: Abdomen is soft. There is no mass.      Tenderness: There is no abdominal tenderness.   Musculoskeletal:      Right lower leg: No edema.      Left lower leg: No edema.   Lymphadenopathy:      Cervical: No cervical adenopathy.   Skin:     General: Skin is warm and dry.      Findings: No rash.   Neurological:      General: No focal deficit present.      Mental Status: He is alert.      Gait: Gait abnormal.   Psychiatric:         Mood and Affect: Mood normal.         Behavior: Behavior normal.         Thought Content: Thought content normal.          Wt Readings from Last 6 Encounters:   05/08/24 262 lb 12.8 oz (119.2 kg)   03/22/24 252 lb (114.3 kg)   03/20/23 242 lb (109.8 kg)   06/30/22 245 lb (111.1 kg)   03/07/22 243 lb (110.2 kg)   11/06/21 245 lb (111.1 kg)             ASSESSMENT/PLAN:   1. Pre-op examination  Physical exam is unremarkable other than the antalgic gait, difficulty getting around, and obesity.  Patient denies any recent cardiovascular symptoms.  No symptoms of infection.  Prior to the hip, he had a good exercise capacity.  We will check blood work and EKG.  If those are unremarkable, then we will get medical clearance for the planned total hip arthroplasty to be done on May 21.  I advised patient to stop Advil and any other NSAIDs at least 7 days prior to the surgery.  - CBC With Differential With Platelet; Future  - Comp Metabolic Panel (14); Future  - EKG 12 Lead    2. Osteoarthritis of left hip, unspecified osteoarthritis type  Surgery as above.     3. Essential hypertension  Well-controlled.  Continue with blood pressure and cholesterol medications through the time of surgery.    4. Gout,  unspecified cause, unspecified chronicity, unspecified site  Recent flare.  They have calm down.       Addendum:  I have reviewed the preoperative testing.  CBC and CMP are essentially normal.  Borderline elevation of alkaline phosphatase is of doubtful clinical significance.  EKG shows \"normal sinus rhythm.  Normal EKG.\"  Based on recent history, exam, and preoperative testing, I see no medical contraindication to proceeding with surgery.           Jaren Godoy MD

## 2024-05-09 NOTE — TELEPHONE ENCOUNTER
Clinical team, please assist    Patient states that when he was in with Dr Godoy, he gave the doctor a form with the pre-op clearance information and where to send the results with the fax number. When the pre-op clearance is completed, please fax the necessary forms and results.

## 2024-05-10 NOTE — TELEPHONE ENCOUNTER
Office staff, please send EKG and labs with the clearance to the fax, to Dr Martinez's. Thank you.

## 2024-05-16 ENCOUNTER — PATIENT MESSAGE (OUTPATIENT)
Dept: INTERNAL MEDICINE CLINIC | Facility: CLINIC | Age: 59
End: 2024-05-16

## 2024-05-16 DIAGNOSIS — M25.552 LEFT HIP PAIN: ICD-10-CM

## 2024-05-16 RX ORDER — TRAMADOL HYDROCHLORIDE 50 MG/1
50 TABLET ORAL EVERY 6 HOURS PRN
Qty: 30 TABLET | Refills: 0 | Status: SHIPPED | OUTPATIENT
Start: 2024-05-16 | End: 2024-05-30

## 2024-05-16 RX ORDER — TRAMADOL HYDROCHLORIDE 50 MG/1
TABLET ORAL
Qty: 30 TABLET | Refills: 0 | OUTPATIENT
Start: 2024-05-16

## 2024-05-16 NOTE — TELEPHONE ENCOUNTER
Please review; protocol failed/No Protocol    Routing to pod mate-Dr. Godoy is out of office.     Fill dates: 04/04/2024-Jayne, 05/16/2024-Alisha Otero PA-C  Patient requesting refill, he's having hip surgery on May 21, 2024 and had to stop Advil 7 days prior to surgery. He is in pain.      [facility] that is doing surgery did call in Tramadol and several other medications today for surgery.  Please advise if a prescription to get him to surgery is appropriate.    Requested Prescriptions   Pending Prescriptions Disp Refills    traMADol 50 MG Oral Tab 30 tablet 0     Sig: Take 1 tablet (50 mg total) by mouth every 6 (six) hours as needed for Pain. Can increase to 2 tablets at a time if needed       Controlled Substance Medication Failed - 5/16/2024 12:21 PM        Failed - This medication is a controlled substance - forward to provider to refill           Future Appointments         Provider Department Appt Notes    In 4 months Jaren Godoy MD [facility] follow up          Recent Outpatient Visits              1 week ago Pre-op examination    [facility]    Office Visit    1 month ago Routine physical examination    [facility]    Office Visit    2 months ago Actinic keratosis    [facility]    Office Visit    9 months ago Actinic keratosis    [facility] Joslyn Goodman MD    Office Visit    1 year ago Routine physical examination    [facility] Jaren Godoy MD    Office Visit

## 2024-05-16 NOTE — TELEPHONE ENCOUNTER
From: Cyril Bauer  To: Jaren Godoy  Sent: 5/16/2024 12:05 PM CDT  Subject: Tramadol    I need to have my prescription of Tramdol refilled. I had to stop the Advils prior to 7 days of surgery and am in some pain. Please refill so I can sleep at night. Thank you.

## 2024-09-24 ENCOUNTER — OFFICE VISIT (OUTPATIENT)
Dept: FAMILY MEDICINE CLINIC | Facility: CLINIC | Age: 59
End: 2024-09-24
Payer: COMMERCIAL

## 2024-09-24 VITALS
DIASTOLIC BLOOD PRESSURE: 88 MMHG | RESPIRATION RATE: 18 BRPM | HEIGHT: 72 IN | BODY MASS INDEX: 34.54 KG/M2 | HEART RATE: 75 BPM | WEIGHT: 255 LBS | TEMPERATURE: 97 F | OXYGEN SATURATION: 98 % | SYSTOLIC BLOOD PRESSURE: 130 MMHG

## 2024-09-24 DIAGNOSIS — J32.9 RHINOSINUSITIS: Primary | ICD-10-CM

## 2024-09-24 LAB
CONTROL LINE PRESENT WITH A CLEAR BACKGROUND (YES/NO): YES YES/NO
KIT LOT #: NORMAL NUMERIC
OPERATOR ID: NORMAL
RAPID SARS-COV-2 BY PCR: NOT DETECTED
STREP GRP A CUL-SCR: NEGATIVE

## 2024-09-24 PROCEDURE — 3075F SYST BP GE 130 - 139MM HG: CPT | Performed by: NURSE PRACTITIONER

## 2024-09-24 PROCEDURE — 3008F BODY MASS INDEX DOCD: CPT | Performed by: NURSE PRACTITIONER

## 2024-09-24 PROCEDURE — 99213 OFFICE O/P EST LOW 20 MIN: CPT | Performed by: NURSE PRACTITIONER

## 2024-09-24 PROCEDURE — 3079F DIAST BP 80-89 MM HG: CPT | Performed by: NURSE PRACTITIONER

## 2024-09-24 PROCEDURE — 87880 STREP A ASSAY W/OPTIC: CPT | Performed by: NURSE PRACTITIONER

## 2024-09-24 PROCEDURE — U0002 COVID-19 LAB TEST NON-CDC: HCPCS | Performed by: NURSE PRACTITIONER

## 2024-09-24 NOTE — PROGRESS NOTES
CHIEF COMPLAINT:     Chief Complaint   Patient presents with    Sore Throat     Sore throat, fatigue, cough, runny nose,and congestion.       HPI:   Cyril Bauer is a 59 year old male presents to clinic with complaint of persistent sore throat/URI symptoms.  Initial onset 2 weeks ago with sore throat.  Felt briefly better than worse again with persistent sore throat, fatigue, congestion, drainage, thick PND, frontal sinus HA.  Taking dayquil, cough drops.  Symptoms worse in AM.  Denies fever, dyspnea, SOB, chest pain, GI complaints, ear pain, loss of smell/taste, or rashes.  Tolerating PO and able to sleep.  Son had similar symptoms.    Current Outpatient Medications   Medication Sig Dispense Refill           lisinopril 10 MG Oral Tab TAKE 2 TABLETS (20MG TOTAL) BY MOUTH DAILY 180 tablet 3    atorvastatin 20 MG Oral Tab Take 1 tablet (20 mg total) by mouth nightly. TAKE ONE TABLET BY MOUTH ONE TIME DAILY 90 tablet 1    traMADol 50 MG Oral Tab  (Patient not taking: Reported on 9/24/2024)        Past Medical History:    Actinic keratosis    hypertrophic AK - left forehead    Age-related nuclear cataract of both eyes    Amblyopia, right    Arthritis    Left hip    Biceps muscle tear    torn llt. bicep - ice hockey injury; surgical repair    Cancer (HCC)    Cataract    monitor    Essential hypertension    Exotropia of right eye    Gout    High blood pressure    High cholesterol    History of strabismus surgery    Hyperlipidemia    Hypertropia of right eye    Lipid screening    Metatarsal bone fracture    left 4th metatarsal break - walking boot, 2007    Primary squamous cell carcinoma of skin of right lower extremity    Wide excision of Right leg SCC / flap reconstruction     Skin cancer    Squamous cell carcinoma - left forearm    Squamous cell carcinoma, leg    Right lower medial leg    Strabismus    bilateral eye surgery x2      Social History:  Social History     Socioeconomic History    Marital status:     Tobacco Use    Smoking status: Never    Smokeless tobacco: Former     Quit date: 1/3/1994    Tobacco comments:     Chewed tobacco for 8 years. Quit in 1994,   Vaping Use    Vaping status: Never Used   Substance and Sexual Activity    Alcohol use: Not Currently     Alcohol/week: 2.0 standard drinks of alcohol     Types: 2 Shots of liquor per week     Comment: 1 times a week    Drug use: No    Sexual activity: Yes     Partners: Female   Other Topics Concern    Caffeine Concern Yes     Comment: soda    Grew up on a farm No    History of tanning No    Outdoor occupation No    Reaction to local anesthetic No    Right Handed Yes    Pt has a pacemaker No    Pt has a defibrillator No        REVIEW OF SYSTEMS:   GENERAL HEALTH: feels well otherwise, normal appetite  SKIN: denies any unusual skin lesions or rashes  HEENT: denies ear pain or difficulty swallowing/eating. See HPI  RESPIRATORY: denies shortness of breath or wheezing  CARDIOVASCULAR: denies chest pain or palpitations   GI: denies vomiting or diarrhea  NEURO: denies dizziness or lightheadedness    EXAM:   /88   Pulse 75   Temp 97.2 °F (36.2 °C)   Resp 18   Ht 6' (1.829 m)   Wt 255 lb (115.7 kg)   SpO2 98%   BMI 34.58 kg/m²   GENERAL: well developed, well nourished, in no apparent distress  SKIN: no rashes, no suspicious lesions  HEAD: atraumatic, normocephalic  EYES: conjunctiva clear, EOM intact  EARS: TM's clear, non-injected, no bulging, retraction, or fluid bilaterally  NOSE: nostrils patent, no exudates, nasal mucosa pink and +inflamed.  +Frontal sinus tenderness.  THROAT: oral mucosa pink, moist.+ Posterior pharynx erythematous with PND. No exudates.   NECK: supple, non-tender  LUNGS: clear to auscultation bilaterally, no wheezes or rhonchi. Breathing is non labored. No cough.  CARDIO: RRR without murmur  LYMPH: No lymphadenopathy.    Recent Results (from the past 24 hour(s))   Strep A Assay W/Optic    Collection Time: 09/24/24  8:30 AM    Result Value Ref Range    Strep Grp A Screen negative Negative    Control Line Present with a clear background (yes/no) yes Yes/No    Kit Lot # [id number] Numeric    Kit Expiration Date 05/21/2024 Date   COVID-19 LAB TEST NON-CDC    Collection Time: 09/24/24  8:57 AM    Specimen: Nares   Result Value Ref Range    Rapid SARS-CoV-2 by PCR Not Detected Not Detected    POCT Lot Number [id number]     POCT Expiration Date 03/24/2026     POCT Procedure Control Control Valid Control Valid     ,889          ASSESSMENT AND PLAN:   Assessment:  Cyril was seen today for sore throat.    Diagnoses and all orders for this visit:    Rhinosinusitis  -     Strep A Assay W/Optic  -     COVID-19 LAB TEST NON-[facility]  -     amoxicillin clavulanate 875-125 MG Oral Tab; Take 1 tablet by mouth 2 (two) times daily for 7 days.          Plan:   - Neg rapid strep  - Neg rapid COVID  - Rx as above.  - Consider Flonase.  - Comfort measures explained and discussed as listed in Patient Instructions.  - Advised follow up in 5-7 days if not improving, condition worsens, or new/persistent fevers.  - Pt verbalizes understanding and is agreeable w/ plan.    There are no Patient Instructions on file for this visit.

## 2024-10-04 RX ORDER — ATORVASTATIN CALCIUM 20 MG/1
20 TABLET, FILM COATED ORAL NIGHTLY
Qty: 90 TABLET | Refills: 3 | Status: SHIPPED | OUTPATIENT
Start: 2024-10-04

## 2024-10-04 NOTE — TELEPHONE ENCOUNTER
Refill Per Protocol     Requested Prescriptions   Pending Prescriptions Disp Refills    ATORVASTATIN 20 MG Oral Tab [Pharmacy Med Name: Atorvastatin Calcium 20 Mg Tab Nort] 90 tablet 0     Sig: Take 1 tablet (20 mg total) by mouth nightly       Cholesterol Medication Protocol Passed - 10/3/2024  3:01 AM        Passed - ALT < 80     Lab Results   Component Value Date    ALT 18 05/08/2024             Passed - ALT resulted within past year        Passed - Lipid panel within past 12 months     Lab Results   Component Value Date    CHOLEST 141 03/19/2024    TRIG 132 03/19/2024    HDL 33 (L) 03/19/2024    LDL 84 03/19/2024    VLDL 21 03/19/2024    NONHDLC 108 03/19/2024             Passed - In person appointment or virtual visit in the past 12 mos or appointment in next 3 mos     Recent Outpatient Visits              1 week ago Rhinosinusitis    [facility], [facility]    Office Visit    4 months [facility]    [facility]    6 months ago [facility]    [facility] Visit    6 months ago [facility]inic keratosis    [facility]    [facility] Visit    1 year ago [facility]inic keratosis    [facility]    [facility] Visit                               Recent Outpatient Visits              1 week ago [facility]    Office Visit    4 months ago [facility]    [facility] Visit    6 months ago [facility] physical [facility]     Office Visit    6 months ago Actinic keratosis    [facility], Joslyn Goodman MD    Office Visit    1 year ago Actinic keratosis    [facility], Joslyn Goodman MD    Office Visit

## 2025-03-12 ENCOUNTER — PATIENT MESSAGE (OUTPATIENT)
Dept: INTERNAL MEDICINE CLINIC | Facility: CLINIC | Age: 60
End: 2025-03-12

## 2025-03-12 DIAGNOSIS — Z00.00 ROUTINE PHYSICAL EXAMINATION: Primary | ICD-10-CM

## 2025-03-12 DIAGNOSIS — Z12.5 PROSTATE CANCER SCREENING: ICD-10-CM

## 2025-03-18 ENCOUNTER — LAB ENCOUNTER (OUTPATIENT)
Dept: LAB | Facility: HOSPITAL | Age: 60
End: 2025-03-18
Attending: NURSE PRACTITIONER
Payer: COMMERCIAL

## 2025-03-18 DIAGNOSIS — Z12.5 PROSTATE CANCER SCREENING: ICD-10-CM

## 2025-03-18 DIAGNOSIS — Z00.00 ROUTINE PHYSICAL EXAMINATION: ICD-10-CM

## 2025-03-18 LAB
ALBUMIN SERPL-MCNC: 4.6 G/DL (ref 3.2–4.8)
ALBUMIN/GLOB SERPL: 1.5 {RATIO} (ref 1–2)
ALP LIVER SERPL-CCNC: 90 U/L
ALT SERPL-CCNC: 19 U/L
ANION GAP SERPL CALC-SCNC: 7 MMOL/L (ref 0–18)
AST SERPL-CCNC: 26 U/L (ref ?–34)
BASOPHILS # BLD AUTO: 0.05 X10(3) UL (ref 0–0.2)
BASOPHILS NFR BLD AUTO: 0.6 %
BILIRUB SERPL-MCNC: 1 MG/DL (ref 0.2–1.1)
BUN BLD-MCNC: 12 MG/DL (ref 9–23)
BUN/CREAT SERPL: 10.6 (ref 10–20)
CALCIUM BLD-MCNC: 9.6 MG/DL (ref 8.7–10.4)
CHLORIDE SERPL-SCNC: 102 MMOL/L (ref 98–112)
CHOLEST SERPL-MCNC: 179 MG/DL (ref ?–200)
CO2 SERPL-SCNC: 26 MMOL/L (ref 21–32)
COMPLEXED PSA SERPL-MCNC: 0.5 NG/ML (ref ?–4)
CREAT BLD-MCNC: 1.13 MG/DL
DEPRECATED RDW RBC AUTO: 41.8 FL (ref 35.1–46.3)
EGFRCR SERPLBLD CKD-EPI 2021: 74 ML/MIN/1.73M2 (ref 60–?)
EOSINOPHIL # BLD AUTO: 0.21 X10(3) UL (ref 0–0.7)
EOSINOPHIL NFR BLD AUTO: 2.5 %
ERYTHROCYTE [DISTWIDTH] IN BLOOD BY AUTOMATED COUNT: 12.1 % (ref 11–15)
FASTING PATIENT LIPID ANSWER: YES
FASTING STATUS PATIENT QL REPORTED: YES
GLOBULIN PLAS-MCNC: 3.1 G/DL (ref 2–3.5)
GLUCOSE BLD-MCNC: 89 MG/DL (ref 70–99)
HCT VFR BLD AUTO: 44.5 %
HDLC SERPL-MCNC: 32 MG/DL (ref 40–59)
HGB BLD-MCNC: 15.1 G/DL
IMM GRANULOCYTES # BLD AUTO: 0.01 X10(3) UL (ref 0–1)
IMM GRANULOCYTES NFR BLD: 0.1 %
LDLC SERPL CALC-MCNC: 118 MG/DL (ref ?–100)
LYMPHOCYTES # BLD AUTO: 3.37 X10(3) UL (ref 1–4)
LYMPHOCYTES NFR BLD AUTO: 40 %
MCH RBC QN AUTO: 31.5 PG (ref 26–34)
MCHC RBC AUTO-ENTMCNC: 33.9 G/DL (ref 31–37)
MCV RBC AUTO: 92.9 FL
MONOCYTES # BLD AUTO: 0.67 X10(3) UL (ref 0.1–1)
MONOCYTES NFR BLD AUTO: 8 %
NEUTROPHILS # BLD AUTO: 4.11 X10 (3) UL (ref 1.5–7.7)
NEUTROPHILS # BLD AUTO: 4.11 X10(3) UL (ref 1.5–7.7)
NEUTROPHILS NFR BLD AUTO: 48.8 %
NONHDLC SERPL-MCNC: 147 MG/DL (ref ?–130)
OSMOLALITY SERPL CALC.SUM OF ELEC: 279 MOSM/KG (ref 275–295)
PLATELET # BLD AUTO: 262 10(3)UL (ref 150–450)
POTASSIUM SERPL-SCNC: 4.3 MMOL/L (ref 3.5–5.1)
PROT SERPL-MCNC: 7.7 G/DL (ref 5.7–8.2)
RBC # BLD AUTO: 4.79 X10(6)UL
SODIUM SERPL-SCNC: 135 MMOL/L (ref 136–145)
TRIGL SERPL-MCNC: 161 MG/DL (ref 30–149)
TSI SER-ACNC: 2.36 UIU/ML (ref 0.55–4.78)
VLDLC SERPL CALC-MCNC: 28 MG/DL (ref 0–30)
WBC # BLD AUTO: 8.4 X10(3) UL (ref 4–11)

## 2025-03-18 PROCEDURE — 36415 COLL VENOUS BLD VENIPUNCTURE: CPT

## 2025-03-18 PROCEDURE — 80061 LIPID PANEL: CPT

## 2025-03-18 PROCEDURE — 80053 COMPREHEN METABOLIC PANEL: CPT

## 2025-03-18 PROCEDURE — 85025 COMPLETE CBC W/AUTO DIFF WBC: CPT

## 2025-03-18 PROCEDURE — 84443 ASSAY THYROID STIM HORMONE: CPT

## 2025-03-26 ENCOUNTER — OFFICE VISIT (OUTPATIENT)
Dept: INTERNAL MEDICINE CLINIC | Facility: CLINIC | Age: 60
End: 2025-03-26
Payer: COMMERCIAL

## 2025-03-26 VITALS
HEIGHT: 72 IN | DIASTOLIC BLOOD PRESSURE: 82 MMHG | SYSTOLIC BLOOD PRESSURE: 118 MMHG | RESPIRATION RATE: 20 BRPM | HEART RATE: 84 BPM | BODY MASS INDEX: 36.16 KG/M2 | WEIGHT: 267 LBS | OXYGEN SATURATION: 97 % | TEMPERATURE: 98 F

## 2025-03-26 DIAGNOSIS — Z13.6 SCREENING, HEART DISEASE, ISCHEMIC: ICD-10-CM

## 2025-03-26 DIAGNOSIS — M10.9 GOUT, UNSPECIFIED CAUSE, UNSPECIFIED CHRONICITY, UNSPECIFIED SITE: ICD-10-CM

## 2025-03-26 DIAGNOSIS — I10 ESSENTIAL HYPERTENSION: ICD-10-CM

## 2025-03-26 DIAGNOSIS — Z96.642 HISTORY OF LEFT HIP REPLACEMENT: ICD-10-CM

## 2025-03-26 DIAGNOSIS — E78.5 HYPERLIPIDEMIA, UNSPECIFIED HYPERLIPIDEMIA TYPE: ICD-10-CM

## 2025-03-26 DIAGNOSIS — Z00.00 ROUTINE PHYSICAL EXAMINATION: Primary | ICD-10-CM

## 2025-03-26 RX ORDER — ATORVASTATIN CALCIUM 40 MG/1
40 TABLET, FILM COATED ORAL NIGHTLY
Qty: 90 TABLET | Refills: 3 | Status: SHIPPED | OUTPATIENT
Start: 2025-03-26

## 2025-03-26 NOTE — PROGRESS NOTES
HPI:    Patient ID: Cyril Bauer is a 60 year old male.    HPI    Patient returns to the office today requesting general physical exam as well as to discuss chronic medical issues as listed on the active problem list below.  Patient last seen in the office by me on May 8 of last year for preoperative evaluation prior to undergoing hip replacement.  During the last visit, the following changes were made: None.  Since the last visit, the patient has seen the following doctors: Ortho for the [facility]. He has done well although painful at first. He went through PT.     Full blood work done on March 18.  Normal PSA, TSH, CMP, CBC.  Lipid profile showed mild elevation.    Today, the patient offers the following complaints: the hip feels great. Oterhwise, he si doing well. Frustrated by lack of weight loss. Weight is up from 6 months ago.   Last gout attack was 6 weeks ago.   Patient describes diet as ok. Wife cooks the food. He has tried fasting, low carb and other plans without success.   For exercise, the patient uses the elliptical and stair stepper and treadmill. Not a lot of weight lifting.   Tobacco and alcohol use reviewed. EtOH is a couple times a week.   Current medications reviewed.   Health maintenance issues reviewed.    Wt Readings from Last 6 Encounters:   03/26/25 267 lb (121.1 kg)   09/24/24 255 lb (115.7 kg)   05/08/24 262 lb 12.8 oz (119.2 kg)   03/22/24 252 lb (114.3 kg)   03/20/23 242 lb (109.8 kg)   06/30/22 245 lb (111.1 kg)       Patient Active Problem List   Diagnosis    Gout    Essential hypertension    Hyperlipidemia    Obesity    Neoplasm of uncertain behavior of skin    SCC (squamous cell carcinoma), leg, right    Granuloma annulare    Sun-damaged skin    Actinic keratosis    Xerotic eczema    History of SCC (squamous cell carcinoma) of skin    Rosacea    Inflamed seborrheic keratosis    History of actinic keratoses    History of strabismus surgery- muscle surgery OD x 3     Amblyopia, right     Hypertropia of right eye    Exotropia of right eye    Age-related nuclear cataract of both eyes        HISTORY:  Past Medical History:    Actinic keratosis    hypertrophic AK - left forehead    Age-related nuclear cataract of both eyes    Amblyopia, right    Arthritis    Left hip    Biceps muscle tear    torn llt. bicep - ice hockey injury; surgical repair    Cancer (HCC)    Cataract    monitor    Essential hypertension    Exotropia of right eye    Gout    High blood pressure    High cholesterol    History of strabismus surgery    Hyperlipidemia    Hypertropia of right eye    Lipid screening    Metatarsal bone fracture    left 4th metatarsal break - walking boot, 2007    Primary squamous cell carcinoma of skin of right lower extremity    Wide excision of Right leg SCC / flap reconstruction     Skin cancer    Squamous cell carcinoma - left forearm    Squamous cell carcinoma, leg    Right lower medial leg    Strabismus    bilateral eye surgery x2      Past Surgical History:   Procedure Laterality Date    Appendectomy  2010    Arthrosc sh tenodesis biceps Bilateral     Colonoscopy N/A 07/24/2020    Procedure: COLONOSCOPY;  Surgeon: Marty Kelsey MD;  Location: [facility]    Eye surgery Bilateral 2005, 2006    x2    Hernia surgery  2010    Other surgical history  2 eye surgeries    Shoulder surg proc unlisted Right     rotator cuff repair    Skin surgery      Skin tissue rearrangement Right 05/15/2020    Wide excision of Right leg SCC / flap reconstruction     Strabismus surgery Bilateral 2005,2006    strabismus sx x 3-1966- unknown doctor, 2005, 2006 both Dr. Bauer      Family History   Problem Relation Age of Onset    Lipids Father         hyperlipidemia    Hypertension Father     Lipids Mother         hyperlipidemia    Hypertension Mother     Macular degeneration Mother     Cataracts Sister     Cataracts Brother     Diabetes Neg     Glaucoma Neg       Social History     Socioeconomic History    Marital  status:    Tobacco Use    Smoking status: Never    Smokeless tobacco: Former     Quit date: 1/3/1994    Tobacco comments:     Chewed tobacco for 8 years. Quit in 1994,   Vaping Use    Vaping status: Never Used   Substance and Sexual Activity    Alcohol use: Not Currently     Alcohol/week: 2.0 standard drinks of alcohol     Types: 2 Shots of liquor per week     Comment: 2 drinks week    Drug use: No    Sexual activity: Yes     Partners: Female   Other Topics Concern    Caffeine Concern Yes     Comment: soda    Grew up on a farm No    History of tanning No    Outdoor occupation No    Reaction to local anesthetic No    Right Handed Yes    Pt has a pacemaker No    Pt has a defibrillator No     Social Drivers of Health     Food Insecurity: No Food Insecurity (3/26/2025)    NCSS - Food Insecurity     Worried About Running Out of Food in the Last Year: No     Ran Out of Food in the Last Year: No   Transportation Needs: No Transportation Needs (3/26/2025)    NCSS - Transportation     Lack of Transportation: No   Housing Stability: Not At Risk (3/26/2025)    NCSS - Housing/Utilities     Has Housing: Yes     Worried About Losing Housing: No     Unable to Get Utilities: No          Review of Systems          Current Outpatient Medications   Medication Sig Dispense Refill    atorvastatin 20 MG Oral Tab Take 1 tablet (20 mg total) by mouth nightly. 90 tablet 3    lisinopril 10 MG Oral Tab TAKE 2 TABLETS (20MG TOTAL) BY MOUTH DAILY 180 tablet 3     Allergies:Allergies[1]     PHYSICAL EXAM:   /82 (BP Location: Left arm, Patient Position: Sitting, Cuff Size: large)   Pulse 84   Temp 97.8 °F (36.6 °C) (Other)   Resp 20   Ht 6' (1.829 m)   Wt 267 lb (121.1 kg)   SpO2 97%   BMI 36.21 kg/m²      Physical Exam  Constitutional:       Appearance: Normal appearance. He is well-developed.   HENT:      Right Ear: Tympanic membrane and ear canal normal.      Left Ear: Tympanic membrane and ear canal normal.      Nose: Nose  normal.      Mouth/Throat:      Pharynx: No oropharyngeal exudate or posterior oropharyngeal erythema.   Eyes:      Conjunctiva/sclera: Conjunctivae normal.      Pupils: Pupils are equal, round, and reactive to light.   Neck:      Thyroid: No thyromegaly.      Vascular: No carotid bruit.   Cardiovascular:      Rate and Rhythm: Normal rate and regular rhythm.      Pulses: Normal pulses.      Heart sounds: Normal heart sounds. No murmur heard.  Pulmonary:      Effort: Pulmonary effort is normal.      Breath sounds: Normal breath sounds. No wheezing or rales.   Abdominal:      General: Bowel sounds are normal.      Palpations: Abdomen is soft. There is no mass.      Tenderness: There is no abdominal tenderness.      Hernia: There is no hernia in the left inguinal area.   Genitourinary:     Penis: Normal and circumcised.       Testes: Normal.   Musculoskeletal:      Right lower leg: No edema.      Left lower leg: No edema.   Lymphadenopathy:      Cervical: No cervical adenopathy.   Skin:     General: Skin is warm and dry.      Findings: No rash.   Neurological:      General: No focal deficit present.      Mental Status: He is alert.      Cranial Nerves: No cranial nerve deficit.      Coordination: Coordination normal.   Psychiatric:         Mood and Affect: Mood normal.         Behavior: Behavior normal.         Thought Content: Thought content normal.         Judgment: Judgment normal.                 ASSESSMENT/PLAN:   1. Routine physical examination  Physical exam is unremarkable.  Active issues as below.  Health maintenance issues reviewed.  Recent blood test reviewed.  Encouraged healthy diet and regular exercise.  Try to lose some weight.  Follow-up in 6 to 12 months.    2. Essential hypertension  Well-controlled.  Continue lisinopril 10 mg a day.  Work on diet and exercise.    3. Gout, unspecified cause, unspecified chronicity, unspecified site  Has been having more flares recently.  Last uric acid level was  totally normal.  Repeat uric acid level at this time.  Consider allopurinol if elevated or near elevated.  - Uric Acid; Future    4. Hyperlipidemia, unspecified hyperlipidemia type  Numbers are not as good as last year when he was on 40 mg of atorvastatin.  He is now on 20 mg.  We will increase the atorvastatin up to 40 mg.    5. History of left hip replacement  Doing very well with the hip replacement.    6. Screening, heart disease, ischemic  Patient with multiple risk factors.  We never did any heart testing before.  He was good to do a cardiac calcium scan several years ago but did not follow through.  Order placed.  Discussed possible outcomes and treatment changes.  - CT CALCIUM SCORING; Future          Meds This Visit:  Requested Prescriptions      No prescriptions requested or ordered in this encounter       Imaging & Referrals:  None         Jaren Godoy MD        [1] No Known Allergies

## 2025-04-02 RX ORDER — LISINOPRIL 10 MG/1
TABLET ORAL
Qty: 180 TABLET | Refills: 3 | Status: SHIPPED | OUTPATIENT
Start: 2025-04-02

## 2025-04-02 NOTE — TELEPHONE ENCOUNTER
Refill passed per [facility] protocol.    Requested Prescriptions   Pending Prescriptions Disp Refills    LISINOPRIL 10 MG Oral Tab [Pharmacy Med Name: Lisinopril 10 Mg Tab Lupi] 180 tablet 0     Sig: TAKE 2 TABLETS (20MG TOTAL) BY MOUTH DAILY       Hypertension Medications Protocol Passed - 4/2/2025 11:58 AM        Passed - CMP or BMP in past 12 months        Passed - Last BP reading less than 140/90     BP Readings from Last 1 Encounters:   03/26/25 118/82               Passed - In person appointment or virtual visit in the past 12 mos or appointment in next 3 mos     Recent Outpatient Visits              1 week ago Routine physical examination    [facility]    Office Visit    6 months ago Rhinosinusitis    [facility], [facility], Delores Mcleod [facility]    Office Visit    10 months [facility]    1 year [facility]    [facility]    1 year ago [facility]inic keratosis    [facility]    [facility]                      Passed - EGFRCR or GFRNAA > 50     GFR Evaluation  EGFRCR: 74 , resulted on 3/18/2025          Passed - Medication is active on med list             Recent Outpatient Visits              1 week ago [facility]    [facility] Visit    6 months ago Rhinosinusitis    [facility]    Office Visit    10 months ago [facility]    [facility]    1 year ago Routine  physical examination    [facility], [facility], Jaren Ash MD    Office Visit    1 year ago Actinic keratosis    [facility], [facility], Joslyn Goodman MD    Office Visit

## 2025-04-09 ENCOUNTER — LAB ENCOUNTER (OUTPATIENT)
Dept: LAB | Age: 60
End: 2025-04-09
Attending: INTERNAL MEDICINE
Payer: COMMERCIAL

## 2025-04-09 ENCOUNTER — HOSPITAL ENCOUNTER (OUTPATIENT)
Dept: CT IMAGING | Age: 60
Discharge: HOME | End: 2025-04-09
Attending: INTERNAL MEDICINE

## 2025-04-09 DIAGNOSIS — Z13.6 SCREENING, HEART DISEASE, ISCHEMIC: ICD-10-CM

## 2025-04-09 DIAGNOSIS — M10.9 GOUT, UNSPECIFIED CAUSE, UNSPECIFIED CHRONICITY, UNSPECIFIED SITE: ICD-10-CM

## 2025-04-09 LAB — URATE SERPL-MCNC: 8.3 MG/DL (ref 3.7–9.2)

## 2025-04-09 PROCEDURE — 84550 ASSAY OF BLOOD/URIC ACID: CPT

## 2025-04-09 PROCEDURE — 36415 COLL VENOUS BLD VENIPUNCTURE: CPT

## 2025-04-15 ENCOUNTER — PATIENT MESSAGE (OUTPATIENT)
Dept: INTERNAL MEDICINE CLINIC | Facility: CLINIC | Age: 60
End: 2025-04-15

## 2025-04-15 NOTE — TELEPHONE ENCOUNTER
please see patient Mychart message below. Patient had the CT calcium score test done on 4/9/2025 .

## 2025-04-16 NOTE — TELEPHONE ENCOUNTER
MyChart message read from Dr Godoy by patient.     Last read by Cyril Bauer at 5:45PM on 4/15/2025.

## 2025-04-18 NOTE — TELEPHONE ENCOUNTER
I spoke with the patient.  He has a follow-up CT scan scheduled on Wednesday.  We discussed the pulmonary nodules.  Also discussed the ascending thoracic aortic ectasia.  I reassured him that he is still able to exercise and workout.  His blood pressure has been well-controlled.  May consider repeat imaging in 6 to 12 months of that.  We will need to see what the CT scan shows of the nodules.

## 2025-04-24 ENCOUNTER — HOSPITAL ENCOUNTER (OUTPATIENT)
Dept: CT IMAGING | Age: 60
Discharge: HOME | End: 2025-04-24
Attending: INTERNAL MEDICINE
Payer: COMMERCIAL

## 2025-04-24 DIAGNOSIS — R91.8 PULMONARY NODULES: ICD-10-CM

## 2025-04-24 PROCEDURE — 71250 CT THORAX DX C-: CPT | Performed by: INTERNAL MEDICINE

## 2025-05-05 ENCOUNTER — PATIENT MESSAGE (OUTPATIENT)
Dept: DERMATOLOGY CLINIC | Facility: CLINIC | Age: 60
End: 2025-05-05

## 2025-07-02 ENCOUNTER — OFFICE VISIT (OUTPATIENT)
Dept: DERMATOLOGY CLINIC | Facility: CLINIC | Age: 60
End: 2025-07-02
Payer: COMMERCIAL

## 2025-07-02 DIAGNOSIS — D23.9 BENIGN NEOPLASM OF SKIN, UNSPECIFIED LOCATION: ICD-10-CM

## 2025-07-02 DIAGNOSIS — D22.9 MULTIPLE BENIGN NEVI: ICD-10-CM

## 2025-07-02 DIAGNOSIS — Z08 ENCOUNTER FOR FOLLOW-UP SURVEILLANCE OF SKIN CANCER: ICD-10-CM

## 2025-07-02 DIAGNOSIS — L82.1 SEBORRHEIC KERATOSES: ICD-10-CM

## 2025-07-02 DIAGNOSIS — L57.0 ACTINIC KERATOSIS: Primary | ICD-10-CM

## 2025-07-02 DIAGNOSIS — D18.01 HEMANGIOMA OF SKIN AND SUBCUTANEOUS TISSUE: ICD-10-CM

## 2025-07-02 DIAGNOSIS — Z85.828 ENCOUNTER FOR FOLLOW-UP SURVEILLANCE OF SKIN CANCER: ICD-10-CM

## 2025-07-02 DIAGNOSIS — L81.4 SOLAR LENTIGO: ICD-10-CM

## 2025-07-02 PROCEDURE — 99213 OFFICE O/P EST LOW 20 MIN: CPT | Performed by: DERMATOLOGY

## 2025-07-02 PROCEDURE — 17000 DESTRUCT PREMALG LESION: CPT | Performed by: DERMATOLOGY

## 2025-07-07 NOTE — PROGRESS NOTES
Cyril Bauer is a 60 year old male.  HPI:     CC:    Chief Complaint   Patient presents with    Full Skin Exam     LOV 3/2024. Pt present for full body skin exam. Pt has hx of Ak's and SCC.          Allergies:  Patient has no known allergies.    HISTORY:    Past Medical History:    Actinic keratosis    hypertrophic AK - left forehead    Age-related nuclear cataract of both eyes    Amblyopia, right    Arthritis    Left hip    Biceps muscle tear    torn llt. bicep - ice hockey injury; surgical repair    Cancer (HCC)    Cataract    monitor    Essential hypertension    Exotropia of right eye    Gout    High blood pressure    High cholesterol    History of strabismus surgery    Hyperlipidemia    Hypertropia of right eye    Lipid screening    Metatarsal bone fracture    left 4th metatarsal break - walking boot, 2007    Primary squamous cell carcinoma of skin of right lower extremity    Wide excision of Right leg SCC / flap reconstruction     Skin cancer    Squamous cell carcinoma - left forearm    Squamous cell carcinoma, leg    Right lower medial leg    Strabismus    bilateral eye surgery x2      Past Surgical History:   Procedure Laterality Date    Appendectomy  2010    Arthrosc sh tenodesis biceps Bilateral     Colonoscopy N/A 07/24/2020    Procedure: COLONOSCOPY;  Surgeon: Marty Kelsey MD;  Location: [facility] ENDOSCOPY    Eye surgery Bilateral 2005, 2006    x2    Hernia surgery  2010    Other surgical history  2 eye surgeries    Shoulder surg proc unlisted Right     rotator cuff repair    Skin surgery      Skin tissue rearrangement Right 05/15/2020    Wide excision of Right leg SCC / flap reconstruction     Strabismus surgery Bilateral 2005,2006    strabismus sx x 3-1966- unknown doctor, 2005, 2006 both Dr. Bauer      Family History   Problem Relation Age of Onset    Lipids Father         hyperlipidemia    Hypertension Father     Lipids Mother         hyperlipidemia    Hypertension Mother     Macular  degeneration Mother     Cataracts Sister     Cataracts Brother     Diabetes Neg     Glaucoma Neg       Social History     Socioeconomic History    Marital status:    Tobacco Use    Smoking status: Never    Smokeless tobacco: Former     Quit date: 1/3/1994    Tobacco comments:     Chewed tobacco for 8 years. Quit in 1994,   Vaping Use    Vaping status: Never Used   Substance and Sexual Activity    Alcohol use: Not Currently     Alcohol/week: 2.0 standard drinks of alcohol     Types: 2 Shots of liquor per week     Comment: 2 drinks week    Drug use: No    Sexual activity: Yes     Partners: Female   Other Topics Concern    Caffeine Concern Yes     Comment: soda    Grew up on a farm No    History of tanning No    Outdoor occupation No    Reaction to local anesthetic No    Right Handed Yes    Pt has a pacemaker No    Pt has a defibrillator No     Social Drivers of Health     Food Insecurity: No Food Insecurity (3/26/2025)    NCSS - Food Insecurity     Worried About Running Out of Food in the Last Year: No     Ran Out of Food in the Last Year: No   Transportation Needs: No Transportation Needs (3/26/2025)    NCSS - Transportation     Lack of Transportation: No   Housing Stability: Not At Risk (3/26/2025)    NCSS - Housing/Utilities     Has Housing: Yes     Worried About Losing Housing: No     Unable to Get Utilities: No        Current Outpatient Medications   Medication Sig Dispense Refill    lisinopril 10 MG Oral Tab TAKE 2 TABLETS (20MG TOTAL) BY MOUTH DAILY. 180 tablet 3    atorvastatin 40 MG Oral Tab Take 1 tablet (40 mg total) by mouth nightly. 90 tablet 3     Allergies:   No Known Allergies    Past Medical History:    Actinic keratosis    hypertrophic AK - left forehead    Age-related nuclear cataract of both eyes    Amblyopia, right    Arthritis    Left hip    Biceps muscle tear    torn llt. bicep - ice hockey injury; surgical repair    Cancer (HCC)    Cataract    monitor    Essential hypertension     Exotropia of right eye    Gout    High blood pressure    High cholesterol    History of strabismus surgery    Hyperlipidemia    Hypertropia of right eye    Lipid screening    Metatarsal bone fracture    left 4th metatarsal break - walking boot, 2007    Primary squamous cell carcinoma of skin of right lower extremity    Wide excision of Right leg SCC / flap reconstruction     Skin cancer    Squamous cell carcinoma - left forearm    Squamous cell carcinoma, leg    Right lower medial leg    Strabismus    bilateral eye surgery x2     Past Surgical History:   Procedure Laterality Date    Appendectomy  2010    Arthrosc sh tenodesis biceps Bilateral     Colonoscopy N/A 07/24/2020    Procedure: COLONOSCOPY;  Surgeon: Marty Kelsey MD;  Location: [facility] ENDOSCOPY    Eye surgery Bilateral 2005, 2006    x2    Hernia surgery  2010    Other surgical history  2 eye surgeries    Shoulder surg proc unlisted Right     rotator cuff repair    Skin surgery      Skin tissue rearrangement Right 05/15/2020    Wide excision of Right leg SCC / flap reconstruction     Strabismus surgery Bilateral 2005,2006    strabismus sx x 3-1966- unknown doctor, 2005, 2006 both Dr. Bauer     Social History     Socioeconomic History    Marital status:      Spouse name: Not on file    Number of children: Not on file    Years of education: Not on file    Highest education level: Not on file   Occupational History    Not on file   Tobacco Use    Smoking status: Never    Smokeless tobacco: Former     Quit date: 1/3/1994    Tobacco comments:     Chewed tobacco for 8 years. Quit in 1994,   Vaping Use    Vaping status: Never Used   Substance and Sexual Activity    Alcohol use: Not Currently     Alcohol/week: 2.0 standard drinks of alcohol     Types: 2 Shots of liquor per week     Comment: 2 drinks week    Drug use: No    Sexual activity: Yes     Partners: Female   Other Topics Concern     Service Not Asked    Blood Transfusions Not Asked     Caffeine Concern Yes     Comment: soda    Occupational Exposure Not Asked    Hobby Hazards Not Asked    Sleep Concern Not Asked    Stress Concern Not Asked    Weight Concern Not Asked    Special Diet Not Asked    Back Care Not Asked    Exercise Not Asked    Bike Helmet Not Asked    Seat Belt Not Asked    Self-Exams Not Asked    Grew up on a farm No    History of tanning No    Outdoor occupation No    Reaction to local anesthetic No    Left Handed Not Asked    Right Handed Yes    Currently spends a great deal of time in the sun Not Asked    Past Sunlamp Treatments for Acne Not Asked    Hx of Spending Great Deal of Time in Sun Not Asked    Bad sunburns in the past Not Asked    Tanning Salons in the Past Not Asked    Hx of Radiation Treatments Not Asked    Regular use of sun block Not Asked    Pt has a pacemaker No    Pt has a defibrillator No   Social History Narrative    Not on file     Social Drivers of Health     Food Insecurity: No Food Insecurity (3/26/2025)    NCSS - Food Insecurity     Worried About Running Out of Food in the Last Year: No     Ran Out of Food in the Last Year: No   Transportation Needs: No Transportation Needs (3/26/2025)    NCSS - Transportation     Lack of Transportation: No   Stress: Not on file   Housing Stability: Not At Risk (3/26/2025)    NCSS - Housing/Utilities     Has Housing: Yes     Worried About Losing Housing: No     Unable to Get Utilities: No     Family History   Problem Relation Age of Onset    Lipids Father         hyperlipidemia    Hypertension Father     Lipids Mother         hyperlipidemia    Hypertension Mother     Macular degeneration Mother     Cataracts Sister     Cataracts Brother     Diabetes Neg     Glaucoma Neg        There were no vitals filed for this visit.    HPI:    Chief Complaint   Patient presents with    Full Skin Exam     LOV 3/2024. Pt present for full body skin exam. Pt has hx of Ak's and SCC.      Follow-up history SCC, multiple skin cancers in the past  left arm right lower leg.  History of AK's    Past notes/ records and appropriate/relevant lab results including pathology and past body maps reviewed. Including outside notes/ PCP notes as appropriate. Updated and new information noted in current visit.     Patient presents with concerns above.    Patient has been in their usual state of health.      History, medications, allergies reviewed as noted.      ROS:  new relevant systemic complaints as noted       Physical Examination:     Well-developed well-nourished patient alert oriented in no acute distress.  Exam total-body performed, including scalp, head, neck, face,nails, hair, external eyes, including conjunctival mucosa, eyelids, lips external ears, back, chest,/ breasts, axillae,  abdomen, arms, legs, palms.     Multiple light to medium brown, well marginated, uniformly pigmented, macules and papules 6 mm and less scattered on exam. pigmented lesions examined with dermoscopy benign-appearing patterns.     Waxy tannish keratotic papules scattered, cherry-red vascular papules scattered.    See map today's date for lesions noted .      Otherwise remarkable for lesions as noted on map.  See details of examination  See Assessment /Plan for additional history and physical exam also:    Assessment / plan:    No orders of the defined types were placed in this encounter.      Meds & Refills for this Visit:  Requested Prescriptions      No prescriptions requested or ordered in this encounter         Encounter Diagnoses   Name Primary?    Actinic keratosis Yes    Solar lentigo     Multiple benign nevi     Seborrheic keratoses     Benign neoplasm of skin, unspecified location     Hemangioma of skin and subcutaneous tissue     Encounter for follow-up surveillance of skin cancer        See details on map.      Remarkable for:      Erythematous scaling keratotic papules noted at sites noted on map  Actinic Keratoses.  Precancerous nature discussed. Sun protection,  sunscreen/ blocks encouraged Lesions treated with cryo- .  Biopsy if not resolved.    Left forearmx1    Actinic Keratoses.  Precancerous nature discussed. Sun protection, sunscreen/ blocks encouraged .  Monitoring for new lesions.  Sun damage additional recurrent and new actinic keratoses, skin cancers may occur in areas of prior actinic keratoses, related to past sun exposure to minimize current sun exposure.  Sunscreen applied consistently regularly, reapplication and sun protection while driving recommended.  Continue sunscreen while outdoors especially with sports    Background of SKs fairly prominent over the forehead cheeks monitor    Concerned with whitish spots over the dorsal hand more waxy tan keratotic Aquatensen SK multiple SKs over the forehead reassurance.  More diffuse AK's over the neck temples consider topicals.    Dermatofibroma right leg reassurance.    Sun damage dyschromia extensive erythematous scaling patches over the forearms.  No more keratotic lesions at this time.  History SCC no recurrence.      Patient with history of skin cancer.  No evidence of recurrence.    No new skin cancer.  Legs, left arm no recurrence SCC      More extensive benign keratoses waxy tan papules back arms legs waxy tan keratotic papules lesions in areas of concern as noted reassurance given.  Benign nature discussed.  Possibility of cryo, alphahydroxy acids over-the-counter retinol's discussed.    No other susupicious lesions on todays  exam.    Please refer to map for specific lesions.  See additional diagnoses.  Pros cons of various therapies, risks benefits discussed.Pathophysiology discussed with patient.  Therapeutic options reviewed.  See  Medications in grid.  Instructions reviewed at length.    Benign nevi, seborrheic  keratoses, cherry angiomas:  Reassurance regarding other benign skin lesions.    Monitor for new or changing lesions. Signs and symptoms of skin cancer, ABCDE's of melanoma ( additional  information available at AAD.org, skincancer.org) Encourage Sunscreen (broad-spectrum, ideally mineral-based-UVA/UVB -SPF 30 or higher) use encouraged, sun protection/sun protective clothing, self exams reviewed Followup as noted RTC ---routine checkup 6 mos -one year or p.r.n.    Encounter Times   Including precharting, reviewing chart, prior notes obtaining history: 10 minutes, medical exam :10 minutes, notes on body map, plan, counseling 10minutes My total time spent caring for the patient on the day of the encounter: 30 minutes     The patient indicates understanding of these issues and agrees to the plan.  The patient is asked to return as noted in follow-up/ above.    This note was generated using Dragon voice recognition software.  Please contact me regarding any confusion resulting from errors in recognition..  Note to patient and family: The 21st Century Cures Act makes medical notes like these available to patients. However, be advised this is a medical document. It is intended as [id number] communication and monitoring of a patient's care needs. It is written in medical language and may contain abbreviations or verbiage that are unfamiliar. It may appear blunt or direct. Medical documents are intended to carry relevant information, facts as evident and the clinical opinion of the practitioner.

## (undated) DEVICE — SUTURE ETHILON 4-0 699G

## (undated) DEVICE — 3M™ STERI-STRIP™ REINFORCED ADHESIVE SKIN CLOSURES, R1548, 1 IN X 5 IN (25 MM X 125 MM), 4 STRIPS/ENVELOPE: Brand: 3M™ STERI-STRIP™

## (undated) DEVICE — DRAPE SHEET LG

## (undated) DEVICE — UNDYED BRAIDED (POLYGLACTIN 910), SYNTHETIC ABSORBABLE SUTURE: Brand: COATED VICRYL

## (undated) DEVICE — LINE MNTR ADLT SET O2 INTMD

## (undated) DEVICE — CLIPPER BLADE 3M

## (undated) DEVICE — PEN 6\" SURGICAL MARKING PURPL

## (undated) DEVICE — FORCEP RADIAL JAW 4

## (undated) DEVICE — Device: Brand: CUSTOM PROCEDURE KIT

## (undated) DEVICE — MEDI-VAC NON-CONDUCTIVE SUCTION TUBING 6MM X 1.8M (6FT.) L: Brand: CARDINAL HEALTH

## (undated) DEVICE — STANDARD HYPODERMIC NEEDLE,POLYPROPYLENE HUB: Brand: MONOJECT

## (undated) DEVICE — LOWER EXTREMITY: Brand: MEDLINE INDUSTRIES, INC.

## (undated) DEVICE — SOL  .9 1000ML BTL

## (undated) DEVICE — Device: Brand: DEFENDO AIR/WATER/SUCTION AND BIOPSY VALVE

## (undated) DEVICE — GAMMEX® PI HYBRID SIZE 7, STERILE POWDER-FREE SURGICAL GLOVE, POLYISOPRENE AND NEOPRENE BLEND: Brand: GAMMEX

## (undated) DEVICE — MEDI-VAC YANKAUER SUCTION HANDLE W/BULBOUS TIP: Brand: CARDINAL HEALTH

## (undated) DEVICE — X-RAY DETECTABLE SPONGES,16 PLY: Brand: VISTEC

## (undated) DEVICE — SUTURE SILK 4-0 P-3

## (undated) DEVICE — SUTURE VICRYL 2-0 FS-1

## (undated) DEVICE — 35 ML SYRINGE REGULAR TIP: Brand: MONOJECT

## (undated) NOTE — LETTER
October 11, 2018     [address] [facility]      Dear Kailey Shallow:    Below are the results from your recent visit:    Resulted Orders   COMP METABOLIC PANEL (14)   Result Value Ref Range    Glucose 94 70 - 99 mg/dL    Sodium 139 13 Monocyte Absolute 0.7 0.0 - 1.0 K/UL    Eosinophil Absolute 0.2 0.0 - 0.7 K/UL    Basophil Absolute 0.0 0.0 - 0.2 K/UL     The blood cell count is normal. There is no evidence of anemia or infection.     The blood sugar (glucose) level is normal. There is

## (undated) NOTE — LETTER
Date & Time: 11/6/2021, 10:33 AM  Patient: Krystal Harrison  Encounter Provider(s):    ROYA Cho       To Whom It May Concern:    Yanira Kirkland was seen and treated in our department on 11/6/2021. He should not return to work until 11/16/21.     If

## (undated) NOTE — LETTER
20      Patient: Aziza Murillo  : 3/23/1965 Visit date: 3/2/2020    Dear Amparo Silverman,      I examined your patient in consultation today. He has a squamous cell carcinoma of the right leg.   We will plan on wide excision under frozen s